# Patient Record
Sex: MALE | Race: BLACK OR AFRICAN AMERICAN | NOT HISPANIC OR LATINO | Employment: FULL TIME | URBAN - METROPOLITAN AREA
[De-identification: names, ages, dates, MRNs, and addresses within clinical notes are randomized per-mention and may not be internally consistent; named-entity substitution may affect disease eponyms.]

---

## 2017-01-03 ENCOUNTER — TELEPHONE (OUTPATIENT)
Dept: FAMILY MEDICINE | Facility: CLINIC | Age: 42
End: 2017-01-03

## 2017-01-03 DIAGNOSIS — E11.65 TYPE 2 DIABETES MELLITUS WITH HYPERGLYCEMIA, WITHOUT LONG-TERM CURRENT USE OF INSULIN (H): Primary | ICD-10-CM

## 2017-01-03 RX ORDER — PRAVASTATIN SODIUM 40 MG
40 TABLET ORAL DAILY
Qty: 90 TABLET | Refills: 3 | Status: SHIPPED | OUTPATIENT
Start: 2017-01-03 | End: 2018-01-17

## 2017-01-03 RX ORDER — GLIPIZIDE 5 MG/1
5 TABLET, FILM COATED, EXTENDED RELEASE ORAL DAILY
Qty: 90 TABLET | Refills: 0 | Status: SHIPPED | OUTPATIENT
Start: 2017-01-03 | End: 2017-01-04

## 2017-01-03 NOTE — TELEPHONE ENCOUNTER
[facility] phone call message- general phone call:    Reason for call: pt called stated that he normally goes to [facility] for his meds but they no longer excepting his ins he would like the meds to go to [facility] [address] He needs his Metformin, Pravacol, and Glipizide he normally gets 5 mg of this but pt wants it cut down to 2.5 mg because the 5mg is to heavy for the pt.  Please call pt back if you have any questions.   Return call needed: Yes    OK to leave a message on voice mail? Yes    Primary language: English      needed? No    Call taken on January 3, 2017 at 10:32 AM by Shnaia Gomez

## 2017-01-03 NOTE — TELEPHONE ENCOUNTER
Medications ordered and sent to [facility] on [address]. Would recommend continuing 5mg glipizide due to A1c>11 from 11/2016 visit. Patient should return for recheck of his diabetes as soon as he can.     Jaleel Ruelas MD; routed to RN

## 2017-01-04 ENCOUNTER — OFFICE VISIT (OUTPATIENT)
Dept: FAMILY MEDICINE | Facility: CLINIC | Age: 42
End: 2017-01-04

## 2017-01-04 VITALS
WEIGHT: 166 LBS | OXYGEN SATURATION: 97 % | BODY MASS INDEX: 25.16 KG/M2 | HEART RATE: 74 BPM | SYSTOLIC BLOOD PRESSURE: 130 MMHG | DIASTOLIC BLOOD PRESSURE: 79 MMHG | HEIGHT: 68 IN | TEMPERATURE: 97.8 F

## 2017-01-04 DIAGNOSIS — R73.9 HYPERGLYCEMIA: ICD-10-CM

## 2017-01-04 DIAGNOSIS — E11.9 TYPE 2 DIABETES MELLITUS WITHOUT COMPLICATION, WITHOUT LONG-TERM CURRENT USE OF INSULIN (H): Primary | ICD-10-CM

## 2017-01-04 DIAGNOSIS — E11.65 TYPE 2 DIABETES MELLITUS WITH HYPERGLYCEMIA, WITHOUT LONG-TERM CURRENT USE OF INSULIN (H): ICD-10-CM

## 2017-01-04 LAB — HBA1C MFR BLD: 8.6 % (ref 4.1–5.7)

## 2017-01-04 RX ORDER — GLIPIZIDE 2.5 MG/1
2.5 TABLET, EXTENDED RELEASE ORAL DAILY
Qty: 90 TABLET | Refills: 1 | Status: SHIPPED | OUTPATIENT
Start: 2017-01-04 | End: 2017-08-07

## 2017-01-04 NOTE — MR AVS SNAPSHOT
After Visit Summary   1/4/2017    Koko Milton    MRN: [id number]           Patient Information     Date Of Birth          1975        Visit Information        Provider Department      1/4/2017 3:10 PM Jaleel Ruelas MD [facility]        Today's Diagnoses     Diabetes mellitus, type 2 (H)    -  1     Type 2 diabetes mellitus with hyperglycemia, without long-term current use of insulin (H)         Hyperglycemia           Care Instructions    Koko was seen today for diabetes.    Diagnoses and all orders for this visit:    Diabetes mellitus, type 2 (H)  -     Hemoglobin A1c ([facility])    Type 2 diabetes mellitus with hyperglycemia, without long-term current use of insulin (H)  -     glipiZIDE (GLUCOTROL XL) 2.5 MG 24 hr tablet; Take 1 tablet (2.5 mg) by mouth daily    Hyperglycemia  -     GAD65 ([facility])  -     C-Peptide ([facility])  -     Misc. Wild Test ([facility])  -     Misc. Wild Test ([facility])      Please return to clinic in 2-3 months for recheck, sooner as needed.     Thank you for coming to [facility].  **If you had lab testing today and your results are reassuring or normal they will be be mailed to you within 7 days.   **If the lab tests need quick action we will call you with the results.  The phone number we will call with results is # [phone] (home) NONE (work). If this is not the best number please call our clinic and change the number.  If you need any refills please call your pharmacy and they will contact us.  If you have any concerns about today's visit or wish to schedule another appointment please call our office during normal business hours [phone] (8-5:00 M-F)  If you have urgent medical concerns please call [phone] at any time of the day.  If you a medical emergency please call [phone]  Again thank you for choosing [facility] and please let us know how we can best partner with you to improve you and your family's health.            Follow-ups  "after your visit        Future tests that were ordered for you today     Open Future Orders        Priority Expected Expires Ordered    Hemoglobin A1c ([facility]) Routine 1/4/2017 1/11/2017 1/4/2017            Who to contact     Please call your clinic at [phone] to:    Ask questions about your health    Make or cancel appointments    Discuss your medicines    Learn about your test results    Speak to your doctor   If you have compliments or concerns about an experience at your clinic, or if you wish to file a complaint, please contact [facility] Patient Relations at [phone] or email us at [email or web address]         Additional Information About Your Visit        [email or web address] Information     Finalta gives you secure access to your electronic health record. If you see a primary care provider, you can also send messages to your care team and make appointments. If you have questions, please call your primary care clinic.  If you do not have a primary care provider, please call [phone] and they will assist you.      Finalta is an electronic gateway that provides easy, online access to your medical records. With Finalta, you can request a clinic appointment, read your test results, renew a prescription or communicate with your care team.     To access your existing account, please contact your [facility] or call [phone] for assistance.        Care EveryWhere ID     This is your Care EveryWhere ID. This could be used by other organizations to access your [place] medical records  [id number]        Your Vitals Were     Pulse Temperature Height BMI (Body Mass Index) Pulse Oximetry       74 97.8  F (36.6  C) 5' 8\" (172.7 cm) 25.25 kg/m2 97%        Blood Pressure from Last 3 Encounters:   01/04/17 130/79   11/01/16 109/74   05/31/16 114/76    Weight from Last 3 Encounters:   01/04/17 166 lb (75.297 kg)   11/01/16 162 lb 6.4 oz (73.664 kg) "   05/31/16 168 lb (76.204 kg)              We Performed the Following     C-Peptide ([facility])     GAD65 ([facility])     Hemoglobin A1c ([facility])     Misc. Wild Test ([facility])     Misc. Wild Test ([facility])          Today's Medication Changes          These changes are accurate as of: 1/4/17  4:04 PM.  If you have any questions, ask your nurse or doctor.               These medicines have changed or have updated prescriptions.        Dose/Directions    glipiZIDE 2.5 MG 24 hr tablet   Commonly known as:  glipiZIDE XL   This may have changed:    - medication strength  - how much to take   Used for:  Type 2 diabetes mellitus with hyperglycemia, without long-term current use of insulin (H)   Changed by:  Jaleel Ruelas MD        Dose:  2.5 mg   Take 1 tablet (2.5 mg) by mouth daily   Quantity:  90 tablet   Refills:  1            Where to get your medicines      These medications were sent to [id number] - [place], [place] - [address], [place] [address]     Phone:  [phone]    - glipiZIDE 2.5 MG 24 hr tablet             Primary Care Provider Office Phone # Fax #    Jaleel Ruelas [phone]       [address] [address]        Thank you!     Thank you for choosing [facility]  for your care. Our goal is always to provide you with excellent care. Hearing back from our patients is one way we can continue to improve our services. Please take a few minutes to complete the written survey that you may receive in the mail after your visit with us. Thank you!             Your Updated Medication List - Protect others around you: Learn how to safely use, store and throw away your medicines at www.disposemymeds.org.          This list is accurate as of: 1/4/17  4:04 PM.  Always use your most recent med list.                   Brand Name Dispense Instructions for use    ACCU-CHEK COMPLETE Kit     1  Units    1 Units daily       BENEFIBER Tabs     120 tablet    Take 1 tablet by mouth every morning       blood glucose monitoring test strip    ONE TOUCH ULTRA    2 Box    Use to test blood sugars 4 times daily or as directed.       glipiZIDE 2.5 MG 24 hr tablet    glipiZIDE XL    90 tablet    Take 1 tablet (2.5 mg) by mouth daily       loratadine 10 MG tablet    CLARITIN    30 tablet    Take 1 tablet (10 mg) by mouth daily       metFORMIN 1000 MG tablet    GLUCOPHAGE    180 tablet    Take 1 tablet (1,000 mg) by mouth 2 times daily (with meals)       order for DME     1 each    Equipment being ordered: One Touch Ultra glucometer with lancets and test strips.       polyethylene glycol powder    MIRALAX    510 g    Take 17 g by mouth daily as needed for constipation       pravastatin 40 MG tablet    PRAVACHOL    90 tablet    Take 1 tablet (40 mg) by mouth daily       psyllium 63 % Powd    METAMUCIL SMOOTH TEXTURE    1 Bottle    Take 3 teaspoonful by mouth 3 times daily Mix in 8 ounces of water       tadalafil 5 MG tablet    CIALIS    30 tablet    Take 1 tablet (5 mg) by mouth daily Never use with nitroglycerin, terazosin or doxazosin.

## 2017-01-04 NOTE — PATIENT INSTRUCTIONS
Koko was seen today for diabetes.    Diagnoses and all orders for this visit:    Diabetes mellitus, type 2 (H)  -     Hemoglobin A1c ([facility])    Type 2 diabetes mellitus with hyperglycemia, without long-term current use of insulin (H)  -     glipiZIDE (GLUCOTROL XL) 2.5 MG 24 hr tablet; Take 1 tablet (2.5 mg) by mouth daily    Hyperglycemia  -     GAD65 ([facility])  -     C-Peptide ([facility])  -     Misc. Wild Test ([facility])  -     Misc. Wild Test ([facility])    Please return to clinic in 2-3 months for recheck, sooner as needed.     Thank you for coming to [facility].  **If you had lab testing today and your results are reassuring or normal they will be be mailed to you within 7 days.   **If the lab tests need quick action we will call you with the results.  The phone number we will call with results is # [phone] (home) NONE (work). If this is not the best number please call our clinic and change the number.  If you need any refills please call your pharmacy and they will contact us.  If you have any concerns about today's visit or wish to schedule another appointment please call our office during normal business hours [phone] (8-5:00 M-F)  If you have urgent medical concerns please call [phone] at any time of the day.  If you a medical emergency please call [phone]  Again thank you for choosing [facility] and please let us know how we can best partner with you to improve you and your family's health.

## 2017-01-04 NOTE — PROGRESS NOTES
OFFICE VISIT    ASSESSMENT/PLAN:   Koko was seen today for diabetes.  Diagnoses and all orders for this visit:    Type 2 diabetes mellitus with hyperglycemia, without long-term current use of insulin (H)  -     glipiZIDE (GLUCOTROL XL) 2.5 MG 24 hr tablet; Take 1 tablet (2.5 mg) by mouth daily        -     Hemoglobin A1c ([facility])    Hyperglycemia: will check for antibodies and c-peptide due to quick changes in A1c values to assess for DMT1.   -     GAD65 ([facility])  -     C-Peptide ([facility])  -     Misc. Wild Test ([facility])  -     Misc. Wild Test ([facility])    Please return to clinic in 2-3 months for recheck, sooner as needed.     SUBJECTIVE: 42 year old male with history of diabetes mellitus (likely type 2) and dyslipidemia presenting with recheck of diabetes. Between June and November, patient states that he has not been exercising as much. Eating seems to be unchanged overall, maybe a little more because of his schedule change in work.  He has not noticed any other changes in life style during this time. Since November, patient has been more active and careful with his eating as well. His father was diagnosed with diabetes in his 60s. Patient has no siblings or nephews/neices/children with diabetes.     A1c = 11.8 on 11/2016  Mediations: metformin and glipizide. Makes stools harder. He has had to take stool softeners for this.   Missed doses: couple of days due to refill lapse recently.   Sugars: does not check sugars at home. Focuses on food. The blood sugar results stress him out when he sees it. I can be 200-300s at times. Has not had symptoms of hypoglycemia, such as palpitations, diaphoresis, fainting, etc.   Blood pressures at home: SBP<120, DBP<80  Neuropathy/complications: none  Eye exam: 12/2016. Normal.   Foot exam: intermittently. No lesions are noted.     The 10 point ROS is negative except as stated in the HPI.    OBJECTIVE:   /79 mmHg  Pulse 74  Temp(Src) 97.8  F (36.6  C)  Ht  "5' 8\" (172.7 cm)  Wt 166 lb (75.297 kg)  BMI 25.25 kg/m2  SpO2 97%  GENERAL: No acute distress. Cooperative. Well-appearing.  HEENT: Normocephalic atraumatic. No conjunctival injection or scleral icterus. Nares patent without rhinorrhea. Oral mucosa is moist  CARDIO: Regular rate and rhythm. S1 S2 present. No murmurs, gallops, or rubs.  RESP: Clear to auscultation bilaterally. No wheezing, rales or rhonchi. Good breath sounds throughout. No use of intercostal or other accessory muscles to breathe.  ABDO: Positive bowel sounds. Non-distended. Non-tender. No guarding. No rebound tenderness.  INTEGUMENTARY: Warm, dry.    Discussed with Dr. Murphy who agrees with the above assessment and plan.    Jaleel Ruelas MD      "

## 2017-01-04 NOTE — PROGRESS NOTES
"Preceptor attestation:  Vital signs reviewed: /79 mmHg  Pulse 74  Temp(Src) 97.8  F (36.6  C)  Ht 5' 8\" (172.7 cm)  Wt 166 lb (75.297 kg)  BMI 25.25 kg/m2  SpO2 97%    Patient seen and discussed with the resident.  Assessment and plan reviewed with resident and agreed upon.    Supervising physician: Valeria Murphy  [facility]  "

## 2017-01-05 DIAGNOSIS — R73.9 HYPERGLYCEMIA: ICD-10-CM

## 2017-01-06 LAB — GAD65 ABY ASSAY SER TEST: 0.02 NMOL/L

## 2017-01-10 ENCOUNTER — RECORDS - HEALTHEAST (OUTPATIENT)
Dept: ADMINISTRATIVE | Facility: OTHER | Age: 42
End: 2017-01-10

## 2017-01-10 LAB
DEPARTMENT: NORMAL
DEPARTMENT: NORMAL
PERFORMING LAB: NORMAL
PERFORMING LAB: NORMAL
SCAN RESULT: NORMAL
SCAN RESULT: NORMAL
SEE NOTE: NORMAL
SEE NOTE: NORMAL

## 2017-03-29 ENCOUNTER — TELEPHONE (OUTPATIENT)
Dept: FAMILY MEDICINE | Facility: CLINIC | Age: 42
End: 2017-03-29

## 2017-03-31 DIAGNOSIS — E11.8 TYPE 2 DIABETES MELLITUS WITH COMPLICATION, WITHOUT LONG-TERM CURRENT USE OF INSULIN (H): Primary | ICD-10-CM

## 2017-04-11 ENCOUNTER — OFFICE VISIT (OUTPATIENT)
Dept: FAMILY MEDICINE | Facility: CLINIC | Age: 42
End: 2017-04-11

## 2017-04-11 VITALS
TEMPERATURE: 97.6 F | WEIGHT: 177.6 LBS | BODY MASS INDEX: 27 KG/M2 | DIASTOLIC BLOOD PRESSURE: 78 MMHG | HEART RATE: 63 BPM | SYSTOLIC BLOOD PRESSURE: 118 MMHG

## 2017-04-11 DIAGNOSIS — E11.8 TYPE 2 DIABETES MELLITUS WITH COMPLICATION, WITHOUT LONG-TERM CURRENT USE OF INSULIN (H): Primary | ICD-10-CM

## 2017-04-11 DIAGNOSIS — R06.7 SNEEZING WITH WATERY EYES: ICD-10-CM

## 2017-04-11 DIAGNOSIS — R07.9 EXERTIONAL CHEST PAIN: ICD-10-CM

## 2017-04-11 DIAGNOSIS — G44.209 TENSION HEADACHE: ICD-10-CM

## 2017-04-11 DIAGNOSIS — H04.209 SNEEZING WITH WATERY EYES: ICD-10-CM

## 2017-04-11 LAB
CHOLEST SERPL-MCNC: 179.2 MG/DL (ref 0–200)
CHOLEST/HDLC SERPL: 5.1 {RATIO} (ref 0–5)
HBA1C MFR BLD: 9.1 % (ref 4.1–5.7)
HDLC SERPL-MCNC: 34.9 MG/DL
LDLC SERPL DIRECT ASSAY-MCNC: 76 MG/DL
TRIGL SERPL-MCNC: 474.8 MG/DL (ref 0–150)
VLDL CHOLESTEROL: 95 MG/DL (ref 7–32)

## 2017-04-11 RX ORDER — LORATADINE 10 MG/1
10 TABLET ORAL DAILY
Qty: 30 TABLET | Refills: 1 | Status: SHIPPED | OUTPATIENT
Start: 2017-04-11 | End: 2018-02-21

## 2017-04-11 NOTE — MR AVS SNAPSHOT
After Visit Summary   4/11/2017    Koko Milton    MRN: [id number]           Patient Information     Date Of Birth          1975        Visit Information        Provider Department      4/11/2017 8:00 AM Rob Padilla MD [facility]        Today's Diagnoses     Type 2 diabetes mellitus with complication, without long-term current use of insulin (H)    -  1    Sneezing with watery eyes        Exertional chest pain        Tension headache           Follow-ups after your visit        Follow-up notes from your care team     Return in about 3 months (around 7/11/2017) for Routine Visit.      Future tests that were ordered for you today     Open Future Orders        Priority Expected Expires Ordered    EXERCISE STRESS TEST NL Routine  7/10/2017 4/11/2017            Who to contact     Please call your clinic at [phone] to:    Ask questions about your health    Make or cancel appointments    Discuss your medicines    Learn about your test results    Speak to your doctor   If you have compliments or concerns about an experience at your clinic, or if you wish to file a complaint, please contact [facility] Patient Relations at [phone] or email us at [email or web address]         Additional Information About Your Visit        MyChart Information     Cinemagramt gives you secure access to your electronic health record. If you see a primary care provider, you can also send messages to your care team and make appointments. If you have questions, please call your primary care clinic.  If you do not have a primary care provider, please call [phone] and they will assist you.      Cinemagramt is an electronic gateway that provides easy, online access to your medical records. With QingKe, you can request a clinic appointment, read your test results, renew a prescription or communicate with your care team.     To access your existing account, please contact your  [facility] or call [phone] for assistance.        Care EveryWhere ID     This is your Care EveryWhere ID. This could be used by other organizations to access your [place] medical records  [id number]        Your Vitals Were     Pulse Temperature BMI (Body Mass Index)             63 97.6  F (36.4  C) (Oral) 27 kg/m2          Blood Pressure from Last 3 Encounters:   04/11/17 118/78   01/04/17 130/79   11/01/16 109/74    Weight from Last 3 Encounters:   04/11/17 177 lb 9.6 oz (80.6 kg)   01/04/17 166 lb (75.3 kg)   11/01/16 162 lb 6.4 oz (73.7 kg)              We Performed the Following     Hemoglobin A1c ([facility])     LDL Cholesterol  Direct ([facility])     Lipid Panel ([place])          Where to get your medicines      These medications were sent to [id number] - [place], [place] - [address], [place] [address]     Phone:  [phone]     loratadine 10 MG tablet          Primary Care Provider Office Phone # Fax #    Jaleel Ruelas [phone]       [facility] [address] [facility] [address]        Thank you!     Thank you for choosing [facility]  for your care. Our goal is always to provide you with excellent care. Hearing back from our patients is one way we can continue to improve our services. Please take a few minutes to complete the written survey that you may receive in the mail after your visit with us. Thank you!             Your Updated Medication List - Protect others around you: Learn how to safely use, store and throw away your medicines at www.disposemymeds.org.          This list is accurate as of: 4/11/17  9:49 AM.  Always use your most recent med list.                   Brand Name Dispense Instructions for use    ACCU-CHEK COMPLETE Kit     1 Units    1 Units daily       BENEFIBER Tabs     120 tablet    Take 1 tablet by mouth every morning       blood glucose  monitoring test strip    ONE TOUCH ULTRA    2 Box    Use to test blood sugars 4 times daily or as directed.       glipiZIDE 2.5 MG 24 hr tablet    glipiZIDE XL    90 tablet    Take 1 tablet (2.5 mg) by mouth daily       loratadine 10 MG tablet    CLARITIN    30 tablet    Take 1 tablet (10 mg) by mouth daily       metFORMIN 1000 MG tablet    GLUCOPHAGE    180 tablet    Take 1 tablet (1,000 mg) by mouth 2 times daily (with meals)       order for DME     1 each    Equipment being ordered: One Touch Ultra glucometer with lancets and test strips.       polyethylene glycol powder    MIRALAX    510 g    Take 17 g by mouth daily as needed for constipation       pravastatin 40 MG tablet    PRAVACHOL    90 tablet    Take 1 tablet (40 mg) by mouth daily       psyllium 63 % Powd    METAMUCIL SMOOTH TEXTURE    1 Bottle    Take 3 teaspoonful by mouth 3 times daily Mix in 8 ounces of water       tadalafil 5 MG tablet    CIALIS    30 tablet    Take 1 tablet (5 mg) by mouth daily Never use with nitroglycerin, terazosin or doxazosin.

## 2017-04-11 NOTE — PROGRESS NOTES
SUBJECTIVE:  Koko Milton is a 42 year old male with a PMH of    Patient Active Problem List   Diagnosis     Diabetes mellitus, type 2 (H)     Dyslipidemia     ED (erectile dysfunction)     Tendinopathy of right rotator cuff     Sneezing with watery eyes     Who presents for evaluation of   Chief Complaint   Patient presents with     Diabetes     DM Check up      Headache     Constant headache for 1 week, states its a mild headache more at night time and in the morning      Eye Problem     Itchy eye x3 months        Currently taking 2.5 of glipizide and 2000 of Metformin.  Has not been testing.  He is trying to control his diet.  Some changes in vision in the AM, no polydipsia or polyuria.    Headache is non specific and occiput, mild achy, Tylenol 500 mg.     Itchy eyes for the last 3 months, itchy eyes and throat.    Endorses tachycardia/palpitations, chest pain is localized, without radiations.     Denies N/V, changes in bowel or bladder    OBJECTIVE:  /78  Pulse 63  Temp 97.6  F (36.4  C) (Oral)  Wt 177 lb 9.6 oz (80.6 kg)  BMI 27 kg/m2  EXAM:  Constitutional: healthy, alert and no distress   Cardiovascular: RRR. No murmurs, clicks gallops or rub  Respiratory: CTAB, no wheezes  NEURO: Gait normal. Reflexes normal and symmetric. Sensation grossly WNL.      ASSESSMENT:  Koko was seen today for diabetes, headache and eye problem.    Diagnoses and all orders for this visit:    Type 2 diabetes mellitus with complication, without long-term current use of insulin (H)  -     Hemoglobin A1c ([facility])  -     Lipid Panel ([facility])    Sneezing with watery eyes  -     loratadine (CLARITIN) 10 MG tablet; Take 1 tablet (10 mg) by mouth daily    Exertional chest pain  -     EXERCISE STRESS TEST NL; Future    Tension headache Discussed with patient increasing his usage of APAP, PRN for his headache, 1000 mg TID.      Total of 20 minutes was spent in face to face contact with patient with > 50% in counseling and  coordination of care.  Options for treatment and/or follow-up care were reviewed with the patient. Koko Milton was engaged and actively involved in the decision making process. He verbalized understanding of the options discussed and was satisfied with the final plan.  RTC in 3 months for follow up of Diabetes or sooner if develops new or worsening symptoms.    Discussed with Dr. Ralph Thornton.     Rob Padilla MD

## 2017-04-11 NOTE — PROGRESS NOTES
Preceptor attestation:  Patient seen and discussed with the resident. Assessment and plan reviewed with resident and agreed upon.  Supervising physician: Ralph Thornton  [facility]

## 2017-04-13 NOTE — PATIENT INSTRUCTIONS
[facility]  Stress ECHO  [phone]  Referral has been faxed they will contact pt to schedule  Michelle Mukherjee 2:07 PM 4/13/2017

## 2017-04-19 ENCOUNTER — TELEPHONE (OUTPATIENT)
Dept: FAMILY MEDICINE | Facility: CLINIC | Age: 42
End: 2017-04-19

## 2017-04-19 ENCOUNTER — RECORDS - HEALTHEAST (OUTPATIENT)
Dept: ADMINISTRATIVE | Facility: OTHER | Age: 42
End: 2017-04-19

## 2017-04-19 NOTE — TELEPHONE ENCOUNTER
Call from [facility] to clarify orders, if test needed is a nuclear med scan (AGNIESZKA SCAN or excersise stress ECHO( treadmill). Please advise and place correct orders.    Routed to Dr Nixon

## 2017-05-11 ENCOUNTER — OFFICE VISIT (OUTPATIENT)
Dept: FAMILY MEDICINE | Facility: CLINIC | Age: 42
End: 2017-05-11

## 2017-05-11 ENCOUNTER — DOCUMENTATION ONLY (OUTPATIENT)
Dept: FAMILY MEDICINE | Facility: CLINIC | Age: 42
End: 2017-05-11

## 2017-05-11 VITALS
HEIGHT: 69 IN | OXYGEN SATURATION: 96 % | BODY MASS INDEX: 25.89 KG/M2 | WEIGHT: 174.8 LBS | HEART RATE: 90 BPM | DIASTOLIC BLOOD PRESSURE: 75 MMHG | TEMPERATURE: 97.8 F | SYSTOLIC BLOOD PRESSURE: 110 MMHG

## 2017-05-11 DIAGNOSIS — F41.9 ANXIETY: Primary | ICD-10-CM

## 2017-05-11 NOTE — PATIENT INSTRUCTIONS
Start sertraline 50 mg daily   Follow-up in clinic in 4 weeks or sooner if needed     Thank you for coming to [facility].  **If you had lab testing today and your results are reassuring or normal they will be be mailed to you within 7 days.   **If the lab tests need quick action we will call you with the results.  The phone number we will call with results is # [phone] (home) NONE (work). If this is not the best number please call our clinic and change the number.  If you need any refills please call your pharmacy and they will contact us.  If you have any concerns about today's visit or wish to schedule another appointment please call our office during normal business hours [phone] (8-5:00 M-F)  If you have urgent medical concerns please call [phone] at any time of the day.  If you a medical emergency please call [phone]  Again thank you for choosing [facility] and please let us know how we can best partner with you to improve you and your family's health.    MENTAL HEALTH REFERRAL:  See 5/11/17 Documentation Only encounter for referral information.  Sayda Pack  5/11/17  Mailed letter to patient with a list of Psychology clinics recommended by   Dr. Infante.  Sayda Pack  5/18/17

## 2017-05-11 NOTE — PROGRESS NOTES
MENTAL HEALTH REFERRAL  Message routed to Team [place] to evaluate for in house therapy.  Sayda Pack  5/11/17

## 2017-05-11 NOTE — PROGRESS NOTES
Hello Team [place]  Please review chart notes to evaluate for in house therapy.  Thank you.  Sayda Salas

## 2017-05-11 NOTE — MR AVS SNAPSHOT
After Visit Summary   5/11/2017    Koko Milton    MRN: [id number]           Patient Information     Date Of Birth          1975        Visit Information        Provider Department      5/11/2017 1:50 PM Darren Wang DO [facility]        Today's Diagnoses     Anxiety    -  1      Care Instructions    Start sertraline 50 mg daily   Follow-up in clinic in 4 weeks or sooner if needed     Thank you for coming to [facility].  **If you had lab testing today and your results are reassuring or normal they will be be mailed to you within 7 days.   **If the lab tests need quick action we will call you with the results.  The phone number we will call with results is # [phone] (home) [phone] (work). If this is not the best number please call our clinic and change the number.  If you need any refills please call your pharmacy and they will contact us.  If you have any concerns about today's visit or wish to schedule another appointment please call our office during normal business hours [phone] (8-5:00 M-F)  If you have urgent medical concerns please call [phone] at any time of the day.  If you a medical emergency please call [phone]  Again thank you for choosing [facility] and please let us know how we can best partner with you to improve you and your family's health.            Follow-ups after your visit        Who to contact     Please call your clinic at [phone] to:    Ask questions about your health    Make or cancel appointments    Discuss your medicines    Learn about your test results    Speak to your doctor   If you have compliments or concerns about an experience at your clinic, or if you wish to file a complaint, please contact [facility] Patient Relations at [phone] or email us at [email or web address]         Additional Information About Your Visit        Internet [employer] - Golden Lines[employer] Information     Festicket gives you secure access to your  "electronic health record. If you see a primary care provider, you can also send messages to your care team and make appointments. If you have questions, please call your primary care clinic.  If you do not have a primary care provider, please call [phone] and they will assist you.      [employer] is an electronic gateway that provides easy, online access to your medical records. With [employer], you can request a clinic appointment, read your test results, renew a prescription or communicate with your care team.     To access your existing account, please contact your [facility] or call [phone] for assistance.        Care EveryWhere ID     This is your Care EveryWhere ID. This could be used by other organizations to access your [place] medical records  [id number]        Your Vitals Were     Pulse Temperature Height Pulse Oximetry BMI (Body Mass Index)       90 97.8  F (36.6  C) (Oral) 5' 9.29\" (176 cm) 92% 25.6 kg/m2        Blood Pressure from Last 3 Encounters:   05/11/17 110/75   04/11/17 118/78   01/04/17 130/79    Weight from Last 3 Encounters:   05/11/17 174 lb 12.8 oz (79.3 kg)   04/11/17 177 lb 9.6 oz (80.6 kg)   01/04/17 166 lb (75.3 kg)              Today, you had the following     No orders found for display         Today's Medication Changes          These changes are accurate as of: 5/11/17  2:35 PM.  If you have any questions, ask your nurse or doctor.               Start taking these medicines.        Dose/Directions    sertraline 50 MG tablet   Commonly known as:  ZOLOFT   Used for:  Anxiety   Started by:  Darren Wang DO        Dose:  50 mg   Take 1 tablet (50 mg) by mouth daily   Quantity:  30 tablet   Refills:  1            Where to get your medicines      These medications were sent to [id number] - [place], [place] - [address], [place] [address]     Phone:  [phone]     sertraline " 50 MG tablet                Primary Care Provider Office Phone # Fax #    Jaleel Ruelas [phone]       [address] [address]        Thank you!     Thank you for choosing [facility]  for your care. Our goal is always to provide you with excellent care. Hearing back from our patients is one way we can continue to improve our services. Please take a few minutes to complete the written survey that you may receive in the mail after your visit with us. Thank you!             Your Updated Medication List - Protect others around you: Learn how to safely use, store and throw away your medicines at www.disposemymeds.org.          This list is accurate as of: 5/11/17  2:35 PM.  Always use your most recent med list.                   Brand Name Dispense Instructions for use    ACCU-CHEK COMPLETE Kit     1 Units    1 Units daily       BENEFIBER Tabs     120 tablet    Take 1 tablet by mouth every morning       blood glucose monitoring test strip    ONE TOUCH ULTRA    2 Box    Use to test blood sugars 4 times daily or as directed.       glipiZIDE 2.5 MG 24 hr tablet    glipiZIDE XL    90 tablet    Take 1 tablet (2.5 mg) by mouth daily       loratadine 10 MG tablet    CLARITIN    30 tablet    Take 1 tablet (10 mg) by mouth daily       metFORMIN 1000 MG tablet    GLUCOPHAGE    180 tablet    Take 1 tablet (1,000 mg) by mouth 2 times daily (with meals)       order for DME     1 each    Equipment being ordered: One Touch Ultra glucometer with lancets and test strips.       polyethylene glycol powder    MIRALAX    510 g    Take 17 g by mouth daily as needed for constipation       pravastatin 40 MG tablet    PRAVACHOL    90 tablet    Take 1 tablet (40 mg) by mouth daily       psyllium 63 % Powd    METAMUCIL SMOOTH TEXTURE    1 Bottle    Take 3 teaspoonful by mouth 3 times daily Mix in 8 ounces of water       sertraline 50 MG tablet    ZOLOFT    30 tablet    Take 1 tablet (50 mg) by  mouth daily       tadalafil 5 MG tablet    CIALIS    30 tablet    Take 1 tablet (5 mg) by mouth daily Never use with nitroglycerin, terazosin or doxazosin.

## 2017-05-11 NOTE — PROGRESS NOTES
"Subjective:  Koko Milton is a 42 year old male with a history of     Patient Active Problem List    Diagnosis Date Noted     Tendinopathy of right rotator cuff 05/31/2016     Priority: Medium     PT ordered 5/31/2016       Sneezing with watery eyes 05/31/2016     Priority: Medium     Loratadine ordered 5/31/16.       Dyslipidemia 04/22/2015     Priority: Medium     ED (erectile dysfunction) 04/22/2015     Priority: Medium     Diabetes mellitus, type 2 (H) 01/06/2015     Priority: Medium     Who presents today with medication concerns.     States that he has had issues with reading for a long period of time and will think about other things while he reads. States that he has 5 children who were diagnosed with ADHD and thinks that he may have issues with ADHD. Also has issues with remembering where he places objects. Has never mentioned his memory issues to a provider in the past.     Unsure if he has family members with memory issues but some of them had mentioned it to him in the past. Usually the concerns seem to revolve around focus or concentration.     He has attended [employer] for the past 4 years.      States that he has stress with his immigration status but has spoken to  in the past. Was living in [place] but moved to [place] due to his citizenship not being accepted. This has been a major focus for him recently and states that he thinks about this frequently.     ROS   General; No fever or chills   HEENT; No sore throat   Heart; No chest pain or chest pressure.   Lungs: Occasional shortness of breath but not currently.     Objective:  Vitals: /75 (BP Location: Left arm, Patient Position: Chair)  Pulse 90  Temp 97.8  F (36.6  C) (Oral)  Ht 5' 9.29\" (176 cm)  Wt 174 lb 12.8 oz (79.3 kg)  SpO2 96%  BMI 25.6 kg/m2  General: Well-nourished. Alert and cooperative. No apparent distress.  HEENT: Normocephalic and atraumatic head.  Extraocular movements intact  Pupils equal, round, and " reactive. Tympanic membranes clear.   Cardiovascular: Regular rate and rhythm. Normal S1 and S2.   Musculoskeletal: No gross deformities. Normal ROM. Gait normal. Normal muscle tone. Strength 5/5 in all 4 extremities.  Skin: No suspicious lesions or rashes.  Neurologic: CN II-XII intact. Sensation symmetric throughout. Patellar reflexes 2+ and symmetric. Cerebellar testing normal.  Psychiatric: Oriented to person, place, and time. Appropriate mood and affect.  Mentation appears normal.    PHQ9: 17    Assessment:  Koko Milton is a 42 year old male with medication concerns.     Plan:    Anxiety/Concerntration Concerns: Uncertain of the cause but his PHQ9 was quite elevated today. 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 Will start him on sertraline and have him follow-up in a few weeks. Will also placed a referral for mental health to evaluate for possible ADHD. Did not have time today but at next visit could put him in contact with [facility].   -     sertraline (ZOLOFT) 50 MG tablet; Take 1 tablet (50 mg) by mouth daily  -     MENTAL HEALTH REFERRAL    Patient was discussed with and seen by Dr. Quinn.    Darren Wang PGY3

## 2017-05-11 NOTE — PROGRESS NOTES
Preceptor attestation:  Patient seen and discussed with the resident. Assessment and plan reviewed with resident and agreed upon.  Supervising physician: Dangelo Quinn  [facility]

## 2017-05-12 ASSESSMENT — PATIENT HEALTH QUESTIONNAIRE - PHQ9: SUM OF ALL RESPONSES TO PHQ QUESTIONS 1-9: 17

## 2017-05-12 NOTE — PROGRESS NOTES
This is a referral for adult ADHD evaluation.  Please provide patient with the following information:    [facility]  [address]  [place]  [address]  [phone]    [facility]  [address]  [place]  [address]  [phone]    [facility]  [address]  [place]  [address]  [phone]

## 2017-06-19 NOTE — TELEPHONE ENCOUNTER
Order was not completed. A new order was not sent.  Message sent to Dr. Padilla and Alessandra Cr

## 2017-06-28 ENCOUNTER — MYC REFILL (OUTPATIENT)
Dept: FAMILY MEDICINE | Facility: CLINIC | Age: 42
End: 2017-06-28

## 2017-06-28 ENCOUNTER — MYC MEDICAL ADVICE (OUTPATIENT)
Dept: FAMILY MEDICINE | Facility: CLINIC | Age: 42
End: 2017-06-28

## 2017-06-28 DIAGNOSIS — E11.9 TYPE 2 DIABETES MELLITUS (H): ICD-10-CM

## 2017-06-28 DIAGNOSIS — K59.00 CONSTIPATION, UNSPECIFIED CONSTIPATION TYPE: ICD-10-CM

## 2017-06-28 DIAGNOSIS — E11.9 TYPE 2 DIABETES MELLITUS WITHOUT COMPLICATION, UNSPECIFIED LONG TERM INSULIN USE STATUS: ICD-10-CM

## 2017-06-28 RX ORDER — BLOOD-GLUCOSE METER
EACH MISCELLANEOUS
Qty: 1 KIT | Refills: 0 | Status: SHIPPED | OUTPATIENT
Start: 2017-06-28 | End: 2018-03-23

## 2017-06-28 NOTE — TELEPHONE ENCOUNTER
Message from [place]:  Linda Jean RN Wed Jun 28, 2017 9:52 AM        ----- Message -----   From: Koko Milton   Sent: 6/28/2017 12:14 AM   To: Miguel eJong Rn  Subject: Medication Renewal Request     Original authorizing provider: MD Koko Arroyo would like a refill of the following medications:  Wheat Dextrin (BENEFIBER) TABS [Giorgi Hand MD]    Preferred pharmacy: [id number] - [place], [place] - [facility] [address] [facility]    Comment:  I need vitamin B12 since metformin decreases vitamin B12

## 2017-06-29 NOTE — TELEPHONE ENCOUNTER
Message from [place]:  Haylie Ordoñez RN Wed Jun 28, 2017 7:36 AM        ----- Message -----   From: Koko Milton   Sent: 6/28/2017 12:11 AM   To: [facility] Staff-  Subject: Medication Renewal Request     Original authorizing provider: MD Koko Quispe would like a refill of the following medications:  order for DME [Conrado Stout MD]    Preferred pharmacy: [facility] #[id number] - [place], [place] - [facility] [address] AT [facility]    Comment:  I need complete kit    Medication renewals requested in this message routed to other providers:  Blood Glucose Monitoring Suppl (ACCU-CHEK COMPLETE) KIT [Cristo Jack MD]

## 2017-08-07 DIAGNOSIS — E11.65 TYPE 2 DIABETES MELLITUS WITH HYPERGLYCEMIA, WITHOUT LONG-TERM CURRENT USE OF INSULIN (H): ICD-10-CM

## 2017-08-07 RX ORDER — GLIPIZIDE 2.5 MG/1
2.5 TABLET, EXTENDED RELEASE ORAL DAILY
Qty: 90 TABLET | Refills: 1 | Status: SHIPPED | OUTPATIENT
Start: 2017-08-07 | End: 2017-09-08

## 2017-09-08 DIAGNOSIS — E11.65 TYPE 2 DIABETES MELLITUS WITH HYPERGLYCEMIA, WITHOUT LONG-TERM CURRENT USE OF INSULIN (H): ICD-10-CM

## 2017-09-09 RX ORDER — GLIPIZIDE 2.5 MG/1
2.5 TABLET, EXTENDED RELEASE ORAL DAILY
Qty: 90 TABLET | Refills: 1 | Status: SHIPPED | OUTPATIENT
Start: 2017-09-09 | End: 2018-02-21

## 2017-09-11 NOTE — TELEPHONE ENCOUNTER
Called patient and informed him to make an appointment to follow up on his DM, he said that he needed to change the pharmacy to [facility]. I called that Rx in to the pharmacy. Transferred him downstairs to make the appointment.

## 2017-09-12 DIAGNOSIS — E11.8 TYPE 2 DIABETES MELLITUS WITH COMPLICATION, WITHOUT LONG-TERM CURRENT USE OF INSULIN (H): Primary | ICD-10-CM

## 2018-01-17 DIAGNOSIS — E11.65 TYPE 2 DIABETES MELLITUS WITH HYPERGLYCEMIA, WITHOUT LONG-TERM CURRENT USE OF INSULIN (H): ICD-10-CM

## 2018-01-17 RX ORDER — PRAVASTATIN SODIUM 40 MG
40 TABLET ORAL DAILY
Qty: 30 TABLET | Refills: 0 | Status: SHIPPED | OUTPATIENT
Start: 2018-01-17 | End: 2018-02-21

## 2018-02-21 ENCOUNTER — OFFICE VISIT (OUTPATIENT)
Dept: FAMILY MEDICINE | Facility: CLINIC | Age: 43
End: 2018-02-21
Payer: COMMERCIAL

## 2018-02-21 VITALS
WEIGHT: 168.4 LBS | BODY MASS INDEX: 24.66 KG/M2 | DIASTOLIC BLOOD PRESSURE: 72 MMHG | HEART RATE: 86 BPM | TEMPERATURE: 98.5 F | SYSTOLIC BLOOD PRESSURE: 112 MMHG | OXYGEN SATURATION: 96 %

## 2018-02-21 DIAGNOSIS — K59.00 CONSTIPATION, UNSPECIFIED CONSTIPATION TYPE: ICD-10-CM

## 2018-02-21 DIAGNOSIS — E11.9 TYPE 2 DIABETES MELLITUS WITHOUT COMPLICATION, WITHOUT LONG-TERM CURRENT USE OF INSULIN (H): ICD-10-CM

## 2018-02-21 DIAGNOSIS — E11.65 TYPE 2 DIABETES MELLITUS WITH HYPERGLYCEMIA, WITHOUT LONG-TERM CURRENT USE OF INSULIN (H): ICD-10-CM

## 2018-02-21 DIAGNOSIS — F41.9 ANXIETY: ICD-10-CM

## 2018-02-21 RX ORDER — POLYETHYLENE GLYCOL 3350 17 G/17G
1 POWDER, FOR SOLUTION ORAL DAILY PRN
Qty: 510 G | Refills: 1 | Status: SHIPPED | OUTPATIENT
Start: 2018-02-21 | End: 2019-06-18

## 2018-02-21 RX ORDER — BLOOD-GLUCOSE METER
EACH MISCELLANEOUS
Qty: 1 KIT | Refills: 0 | Status: CANCELLED | OUTPATIENT
Start: 2018-02-21

## 2018-02-21 RX ORDER — GLIPIZIDE 2.5 MG/1
2.5 TABLET, EXTENDED RELEASE ORAL DAILY
Qty: 90 TABLET | Refills: 1 | Status: SHIPPED | OUTPATIENT
Start: 2018-02-21 | End: 2018-03-23

## 2018-02-21 RX ORDER — PRAVASTATIN SODIUM 40 MG
40 TABLET ORAL DAILY
Qty: 30 TABLET | Refills: 0 | Status: SHIPPED | OUTPATIENT
Start: 2018-02-21 | End: 2018-03-23

## 2018-02-21 NOTE — MR AVS SNAPSHOT
After Visit Summary   2018    Koko Milton    MRN: [id number]           Patient Information     Date Of Birth          1975        Visit Information        Provider Department      2018 3:50 PM Nydia Addison MD [facility]        Today's Diagnoses     Constipation, unspecified constipation type        Anxiety        Type 2 diabetes mellitus with hyperglycemia, without long-term current use of insulin (H)        Type 2 diabetes mellitus without complication, without long-term current use of insulin (H)           Follow-ups after your visit        Follow-up notes from your care team     Return in about 3 months (around 2018).      Future tests that were ordered for you today     Open Future Orders        Priority Expected Expires Ordered    Hemoglobin A1c ([facility]) Routine  3/21/2018 2018    Basic Metabolic Panel ([facility]) Routine  3/21/2018 2018    Lipid Panel ([facility]) Routine  3/21/2018 2018            Who to contact     Please call your clinic at [phone] to:    Ask questions about your health    Make or cancel appointments    Discuss your medicines    Learn about your test results    Speak to your doctor            Additional Information About Your Visit        MyChart Information     [employer] is an electronic gateway that provides easy, online access to your medical records. With [employer], you can request a clinic appointment, read your test results, renew a prescription or communicate with your care team.     To sign up for [employer] visit the website at [email or web address]   You will be asked to enter the access code listed below, as well as some personal information. Please follow the directions to create your username and password.     Your access code is: [email or web address]  Expires: 2018  4:59 PM     Your access code will  in 90 days. If you need help or a new code, please contact your [facility] or  call [phone] for assistance.        Care EveryWhere ID     This is your Care EveryWhere ID. This could be used by other organizations to access your [place] medical records  [id number]        Your Vitals Were     Pulse Temperature Pulse Oximetry BMI (Body Mass Index)          86 98.5  F (36.9  C) (Oral) 96% 24.66 kg/m2         Blood Pressure from Last 3 Encounters:   02/21/18 112/72   05/11/17 110/75   04/11/17 118/78    Weight from Last 3 Encounters:   02/21/18 168 lb 6.4 oz (76.4 kg)   05/11/17 174 lb 12.8 oz (79.3 kg)   04/11/17 177 lb 9.6 oz (80.6 kg)                 Where to get your medicines      These medications were sent to [id number] - [place], [place] - [address] AT [facility]  [address]  [facility] [address], [place] [address]    Hours:  24-hours Phone:  [phone]     glipiZIDE 2.5 MG 24 hr tablet    metFORMIN 1000 MG tablet    polyethylene glycol powder    pravastatin 40 MG tablet    sertraline 50 MG tablet         Some of these will need a paper prescription and others can be bought over the counter.  Ask your nurse if you have questions.     Bring a paper prescription for each of these medications     order for DME          Primary Care Provider Office Phone # Fax #    Vijaya Mayo [phone]       [facility] [address] [place] [address]        Equal Access to Services     ARACELI BERG AH: Hadii sarai ku hadasho Soomaali, waaxda luqadaha, qaybta kaalmada adeegyada, jensen rodríguez. So [facility] [phone].    ATENCIÓN: Si habla español, tiene a bentley disposición servicios gratuitos de asistencia lingüística. Llame al [phone].    We comply with applicable federal civil rights laws and [place] laws. We do not discriminate on the basis of race, color, national origin, age, disability, sex, sexual orientation, or gender identity.            Thank you!     Thank you for choosing [facility]   for your care. Our goal is always to provide you with excellent care. Hearing back from our patients is one way we can continue to improve our services. Please take a few minutes to complete the written survey that you may receive in the mail after your visit with us. Thank you!             Your Updated Medication List - Protect others around you: Learn how to safely use, store and throw away your medicines at www.disposemymeds.org.          This list is accurate as of 2/21/18  4:59 PM.  Always use your most recent med list.                   Brand Name Dispense Instructions for use Diagnosis    * ACCU-CHEK COMPLETE Kit     1 Units    1 Units daily    Type 2 diabetes mellitus (H)       * blood glucose monitoring meter device kit     1 kit    Use to test blood sugars 4 times daily or as directed.    Type 2 diabetes mellitus (H)       blood glucose monitoring test strip    ONETOUCH ULTRA    2 Box    Use to test blood sugars 4 times daily or as directed.    Diabetes mellitus, type 2 (H)       glipiZIDE 2.5 MG 24 hr tablet    GLUCOTROL XL    90 tablet    Take 1 tablet (2.5 mg) by mouth daily    Type 2 diabetes mellitus with hyperglycemia, without long-term current use of insulin (H)       metFORMIN 1000 MG tablet    GLUCOPHAGE    60 tablet    Take 1 tablet (1,000 mg) by mouth 2 times daily (with meals) Needs appointment for further refills.    Type 2 diabetes mellitus with hyperglycemia, without long-term current use of insulin (H)       order for DME     1 each    Equipment being ordered: One Touch Ultra glucometer with lancets and test strips.    Type 2 diabetes mellitus without complication, without long-term current use of insulin (H)       polyethylene glycol powder    MIRALAX    510 g    Take 17 g (1 capful) by mouth daily as needed for constipation    Constipation, unspecified constipation type       pravastatin 40 MG tablet    PRAVACHOL    30 tablet    Take 1 tablet (40 mg) by mouth daily Needs appointment for  further refills.    Type 2 diabetes mellitus with hyperglycemia, without long-term current use of insulin (H)       sertraline 50 MG tablet    ZOLOFT    30 tablet    Take 1 tablet (50 mg) by mouth daily    Anxiety       * Notice:  This list has 2 medication(s) that are the same as other medications prescribed for you. Read the directions carefully, and ask your doctor or other care provider to review them with you.

## 2018-02-21 NOTE — PROGRESS NOTES
SUBJECTIVE       Koko Milton is a 43 year old  male with a PMH significant for:     Patient Active Problem List   Diagnosis     Dyslipidemia     ED (erectile dysfunction)     Tendinopathy of right rotator cuff     Sneezing with watery eyes     Type 2 diabetes mellitus with complication, without long-term current use of insulin (H)     He presents with for left-sided abdominal pain and a diabetes check.    Patient states his left-sided pain started a year ago and has gradually been getting worse.  It is currently gotten to the point where he cannot sleep on his left side.  He describes it as sharp and located over his lower left quadrant.  He denies any radiation.  The pain is worse with sitting for long periods of time.  The pains improved after bowel movement and with Tylenol.  Sometimes the pain will wake him up from sleep.  Patient has been struggling with constipation for the last few years.  He associates this to his glipizide use.    Patient's last diabetes check was 6 months ago.  He states he continues to take his meds regularly.  If he does not he developed symptoms.  Patient does not check his blood sugars and currently does not have a meter to do so.  His last eye exam by an optometrist 5/10/17.  He states he takes good care of his feet and checks them regularly.  He denies any numbness or tingling in his fingers or toes.  Any change in vision.  Patient needs refills on his medications today    PMH, Medications and Allergies were reviewed and updated as needed.        REVIEW OF SYSTEMS     CONSTITUTIONAL: NEGATIVE for fever, chills, change in weight  INTEGUMENTARY/SKIN: NEGATIVE for worrisome rashes, moles or lesions  EYES: NEGATIVE for vision changes   ENT/MOUTH: NEGATIVE for hearing changes  RESP: NEGATIVE for significant cough or SOB  CV: NEGATIVE for chest pain  GI: NEGATIVE for nausea, abdominal pain, heartburn POS constipation   : NEGATIVE for frequency, dysuria  NEURO: NEGATIVE for  weakness, dizziness or paresthesias  PSYCHIATRIC: NEGATIVE for changes in mood or affect        OBJECTIVE     Vitals:    02/21/18 1605   BP: 112/72   Pulse: 86   Temp: 98.5  F (36.9  C)   TempSrc: Oral   SpO2: 96%   Weight: 168 lb 6.4 oz (76.4 kg)     Body mass index is 24.66 kg/(m^2).    Constitutional: Awake, alert, cooperative, no apparent distress, and appears stated age.  Eyes: Lids and lashes normal, pupils equal, round and reactive to light, sclera clear, conjunctiva normal.  ENT: Normocephalic, without obvious abnormality, atramatic,  Neck: Supple, symmetrical, trachea midline, no adenopathy  Lungs: No increased work of breathing, good air exchange, clear to auscultation bilaterally, no crackles or wheezing.  Cardiovascular: Regular rate and rhythm, normal S1 and S2, no S3 or S4, and no murmur noted.  Abdomen: No scars, normal bowel sounds, soft, non-distended, tender to deep palpation of lower left quadrant, no masses palpated, no hepatosplenomegally. Negative Rebound   Musculoskeletal: Feet appear clean and dry, no skin lesions noted, no findings between toes, normal sentation throughout bottom of feet bilaterally   Neurologic: Awake, alert, oriented to name, place and time.  Cranial nerves II-XII are grossly intact.  Gait is normal.  Neuropsychiatric: Normal affect, mood, orientation, memory and insight.  Skin: No rashes, erythema, pallor, petechia or purpura.    No results found for this or any previous visit (from the past 24 hour(s)).    ASSESSMENT AND PLAN     Koko was seen today for recheck, other and refill request.    Diagnoses and all orders for this visit:    Constipation, unspecified constipation type  -     polyethylene glycol (MIRALAX) powder; Take 17 g (1 capful) by mouth daily as needed for constipation    Anxiety  -     sertraline (ZOLOFT) 50 MG tablet; Take 1 tablet (50 mg) by mouth daily    Type 2 diabetes mellitus with hyperglycemia, without long-term current use of insulin (H)  -      pravastatin (PRAVACHOL) 40 MG tablet; Take 1 tablet (40 mg) by mouth daily Needs appointment for further refills.  -     metFORMIN (GLUCOPHAGE) 1000 MG tablet; Take 1 tablet (1,000 mg) by mouth 2 times daily (with meals) Needs appointment for further refills.  -     glipiZIDE (GLUCOTROL XL) 2.5 MG 24 hr tablet; Take 1 tablet (2.5 mg) by mouth daily    Type 2 diabetes mellitus without complication, without long-term current use of insulin (H)  -     order for DME; Equipment being ordered: One Touch Ultra glucometer with lancets and test strips.  -     Hemoglobin A1c ([facility]); Future  -     Basic Metabolic Panel ([facility]); Future  -     Lipid Panel ([facility]); Future  - Foot exam completed today  -Patient states he does not have time to stop with the lab today but will do so fasting within the week    RTC this week for labs and in 3 months for follow up of diabetes or sooner if develops new or worsening symptoms.  I discussed the patient with Dr. Mireles who is in agreement with the assessment and plan.   Nydia Addison

## 2018-03-01 NOTE — PROGRESS NOTES
Preceptor attestation:  Patient seen and discussed with the resident. Assessment and plan reviewed with resident and agreed upon.  Supervising physician: Burton Mireles  [facility]

## 2018-03-23 ENCOUNTER — TELEPHONE (OUTPATIENT)
Dept: FAMILY MEDICINE | Facility: CLINIC | Age: 43
End: 2018-03-23

## 2018-03-23 DIAGNOSIS — F41.9 ANXIETY: ICD-10-CM

## 2018-03-23 DIAGNOSIS — E11.65 TYPE 2 DIABETES MELLITUS WITH HYPERGLYCEMIA, WITHOUT LONG-TERM CURRENT USE OF INSULIN (H): ICD-10-CM

## 2018-03-23 DIAGNOSIS — E11.9 TYPE 2 DIABETES MELLITUS WITHOUT COMPLICATION, WITHOUT LONG-TERM CURRENT USE OF INSULIN (H): ICD-10-CM

## 2018-03-23 DIAGNOSIS — E11.00 TYPE 2 DIABETES MELLITUS WITH HYPEROSMOLARITY WITHOUT COMA, WITHOUT LONG-TERM CURRENT USE OF INSULIN (H): ICD-10-CM

## 2018-03-23 LAB
ANION GAP SERPL CALCULATED.3IONS-SCNC: 9 MMOL/L (ref 5–18)
BUN SERPL-MCNC: 8 MG/DL (ref 8–22)
CALCIUM SERPL-MCNC: 9.3 MG/DL (ref 8.5–10.5)
CHLORIDE SERPL-SCNC: 99 MMOL/L (ref 98–107)
CHOLEST SERPL-MCNC: 224 MG/DL
CO2 SERPL-SCNC: 27 MMOL/L (ref 22–31)
CREAT SERPL-MCNC: 0.91 MG/DL (ref 0.7–1.3)
FASTING?: ABNORMAL
GLUCOSE SERPL-MCNC: 443 MG/DL (ref 70–125)
HBA1C MFR BLD: 11 % (ref 4.1–5.7)
HDLC SERPL-MCNC: 39 MG/DL
LDLC SERPL CALC-MCNC: 113 MG/DL
POTASSIUM SERPL-SCNC: 4.8 MMOL/L (ref 3.5–5)
SODIUM SERPL-SCNC: 135 MMOL/L (ref 136–145)
TRIGL SERPL-MCNC: 358 MG/DL

## 2018-03-23 RX ORDER — BLOOD-GLUCOSE METER
EACH MISCELLANEOUS
Qty: 1 KIT | Refills: 0 | Status: SHIPPED | OUTPATIENT
Start: 2018-03-23 | End: 2018-05-30

## 2018-03-23 NOTE — TELEPHONE ENCOUNTER
[facility] phone call message- patient requesting a refill:    Full Medication Name: metFORMIN (GLUCOPHAGE) 1000 MG tablet-Take 1 tablet (1,000 mg) by mouth 2 times daily (with meals) Needs appointment for further refills.- pravastatin (PRAVACHOL) 40 MG tablet-Take 1 tablet (40 mg) by mouth daily Needs appointment for further refills.- glipiZIDE (GLUCOTROL XL) 2.5 MG 24 hr tablet-Take 1 tablet (2.5 mg) by mouth daily- sertraline (ZOLOFT) 50 MG tablet-Take 1 tablet (50 mg) by mouth daily        Pharmacy confirmed as     [id number] - [place], [place] - [address] AT [facility] [address]  [place] [address]  Phone: [phone] Fax: [phone]  : Yes    Additional Comments: None      OK to leave a message on voice mail? Yes    Primary language: English      needed? No    Call taken on March 23, 2018 at 4:21 PM by Grisel Flores-Cardona

## 2018-03-24 NOTE — TELEPHONE ENCOUNTER
Received call from lab re: critical blood glucose 443  Sodium slightly low, likely because of blood glucose  Electrolytes and kidneys otherwise OK  A1c 11%    Patient reports no new symptoms today. Has had intermittent dizziness and fatigue x1 month, not acutely worsening. No abdominal pain, nausea, etc.    Taking metformin 1000mg BID and glipizide 2.5mg Qd, took meds today  Ran out of testing supplies    Suggested patient take another glipizide tonight  Start glipizide 5mg QD on Sunday  Continue Metformin  Be seen early next week for diabetes f/u  Meter supply refills sent to pharmacy, asked him to check daily and if >400 and new symptoms, needs to be seen or call with any questions    He expresses understanding and agreement with the plan    Carolyn Collier MD  PGY-2  Pager # [phone]

## 2018-03-25 RX ORDER — PRAVASTATIN SODIUM 40 MG
40 TABLET ORAL DAILY
Qty: 30 TABLET | Refills: 0 | Status: SHIPPED | OUTPATIENT
Start: 2018-03-25 | End: 2018-03-27

## 2018-03-25 RX ORDER — GLIPIZIDE 2.5 MG/1
2.5 TABLET, EXTENDED RELEASE ORAL DAILY
Qty: 90 TABLET | Refills: 1 | Status: SHIPPED | OUTPATIENT
Start: 2018-03-25 | End: 2018-03-27

## 2018-03-27 DIAGNOSIS — E11.65 TYPE 2 DIABETES MELLITUS WITH HYPERGLYCEMIA, WITHOUT LONG-TERM CURRENT USE OF INSULIN (H): ICD-10-CM

## 2018-03-27 DIAGNOSIS — F41.9 ANXIETY: ICD-10-CM

## 2018-03-27 RX ORDER — GLIPIZIDE 2.5 MG/1
2.5 TABLET, EXTENDED RELEASE ORAL DAILY
Qty: 90 TABLET | Refills: 1 | Status: SHIPPED | OUTPATIENT
Start: 2018-03-27 | End: 2019-01-16

## 2018-03-27 RX ORDER — PRAVASTATIN SODIUM 40 MG
40 TABLET ORAL DAILY
Qty: 30 TABLET | Refills: 0 | Status: SHIPPED | OUTPATIENT
Start: 2018-03-27 | End: 2018-05-26

## 2018-05-23 ENCOUNTER — TELEPHONE (OUTPATIENT)
Dept: FAMILY MEDICINE | Facility: CLINIC | Age: 43
End: 2018-05-23

## 2018-05-23 DIAGNOSIS — J30.2 ACUTE SEASONAL ALLERGIC RHINITIS, UNSPECIFIED TRIGGER: Primary | ICD-10-CM

## 2018-05-23 NOTE — TELEPHONE ENCOUNTER
Pharmacy request for Loratadine 10 mg tablets. It is not list on patient's med list please advise.    Melissa Nieves, CMA

## 2018-05-24 ENCOUNTER — RECORDS - HEALTHEAST (OUTPATIENT)
Dept: ADMINISTRATIVE | Facility: OTHER | Age: 43
End: 2018-05-24

## 2018-05-24 ENCOUNTER — OFFICE VISIT (OUTPATIENT)
Dept: FAMILY MEDICINE | Facility: CLINIC | Age: 43
End: 2018-05-24
Payer: COMMERCIAL

## 2018-05-24 VITALS
HEIGHT: 69 IN | BODY MASS INDEX: 24.14 KG/M2 | OXYGEN SATURATION: 93 % | DIASTOLIC BLOOD PRESSURE: 74 MMHG | TEMPERATURE: 97.8 F | RESPIRATION RATE: 16 BRPM | WEIGHT: 163 LBS | HEART RATE: 64 BPM | SYSTOLIC BLOOD PRESSURE: 108 MMHG

## 2018-05-24 DIAGNOSIS — F41.9 ANXIETY: ICD-10-CM

## 2018-05-24 DIAGNOSIS — E55.9 VITAMIN D DEFICIENCY: ICD-10-CM

## 2018-05-24 DIAGNOSIS — E11.8 TYPE 2 DIABETES MELLITUS WITH COMPLICATION, WITHOUT LONG-TERM CURRENT USE OF INSULIN (H): Primary | ICD-10-CM

## 2018-05-24 LAB
BUN SERPL-MCNC: 8.1 MG/DL (ref 7–21)
CALCIUM SERPL-MCNC: 9.6 MG/DL (ref 8.5–10.1)
CHLORIDE SERPLBLD-SCNC: 94.8 MMOL/L (ref 98–110)
CHOLEST SERPL-MCNC: 239 MG/DL (ref 0–200)
CHOLEST/HDLC SERPL: 6.8 {RATIO} (ref 0–5)
CO2 SERPL-SCNC: 26.7 MMOL/L (ref 20–32)
CREAT SERPL-MCNC: 0.5 MG/DL (ref 0.7–1.3)
CREAT UR-MCNC: 66.9 MG/DL
GFR SERPL CREATININE-BSD FRML MDRD: >90 ML/MIN/1.7 M2
GLUCOSE SERPL-MCNC: 386.6 MG'DL (ref 70–99)
HBA1C MFR BLD: 11 % (ref 4.1–5.7)
HDLC SERPL-MCNC: 35.1 MG/DL
LDLC SERPL DIRECT ASSAY-MCNC: 97 MG/DL
MICROALBUMIN UR-MCNC: 0.92 MG/DL (ref 0–1.99)
MICROALBUMIN/CREAT UR: 13.8 MG/G
POTASSIUM SERPL-SCNC: 4.1 MMOL/DL (ref 3.2–4.6)
SODIUM SERPL-SCNC: 130.4 MMOL/L (ref 132–142)
TRIGL SERPL-MCNC: 606.8 MG/DL (ref 0–150)
TSH SERPL DL<=0.05 MIU/L-ACNC: 1.7 UIU/ML (ref 0.3–5)
VLDL CHOLESTEROL: 121.4 MG/DL (ref 7–32)

## 2018-05-24 RX ORDER — LORATADINE 10 MG/1
10 TABLET ORAL DAILY
Qty: 90 TABLET | Refills: 1 | Status: SHIPPED | OUTPATIENT
Start: 2018-05-24 | End: 2019-06-18

## 2018-05-24 NOTE — PATIENT INSTRUCTIONS
Your A1c is not in target. Target should be 7.0, because you are a healthy young man. You should have diabetes education, we will call about this. You need to check you sugars at least once a day. Your A1c was 11.0 today. Will follow-up in 2 months. Will make a decision to start a different medication at our follow-up. Thank you for considering insulin as a treatment. Insulin therapy can be temporary for 3-6 months. Once you are in better control, we can continue only pills for your diabetes.    Instructed to start taken diabetes medication once Ramadan is done and  Can take 2 of glipizide( 2.5 Mg) xl    Referral sent to  [facility].  They will contact patient to schedule.  Rebekah  05/24/18    Per  [facility], patient no showed appointment.  Sayda Salas  7/2/18

## 2018-05-24 NOTE — MR AVS SNAPSHOT
"              After Visit Summary   5/24/2018    Koko Milton    MRN: [id number]           Patient Information     Date Of Birth          1975        Visit Information        Provider Department      5/24/2018 8:00 AM Burton Mireles MD [facility]        Today's Diagnoses     Type 2 diabetes mellitus with complication, without long-term current use of insulin (H)    -  1      Care Instructions    Your A1c is not in target. Target should be 7.0, because you are a healthy young man. You should have diabetes education, we will call about this. You need to check you sugars at least once a day. Your A1c was 11.0 today. Will follow-up in 2 months. Will make a decision to start a different medication at our follow-up. Thank you for considering insulin as a treatment. Insulin therapy can be temporary for 3-6 months. Once you are in better control, we can continue only pills for your diabetes.           Follow-ups after your visit        Who to contact     Please call your clinic at [phone] to:    Ask questions about your health    Make or cancel appointments    Discuss your medicines    Learn about your test results    Speak to your doctor            Additional Information About Your Visit        Care EveryWhere ID     This is your Care EveryWhere ID. This could be used by other organizations to access your [facility] medical records  [id number]        Your Vitals Were     Pulse Temperature Respirations Height Pulse Oximetry BMI (Body Mass Index)    64 97.8  F (36.6  C) (Oral) 16 5' 9.13\" (175.6 cm) 93% 23.98 kg/m2       Blood Pressure from Last 3 Encounters:   05/24/18 108/74   02/21/18 112/72   05/11/17 110/75    Weight from Last 3 Encounters:   05/24/18 163 lb (73.9 kg)   02/21/18 168 lb 6.4 oz (76.4 kg)   05/11/17 174 lb 12.8 oz (79.3 kg)              We Performed the Following     Basic Metabolic Panel ([place])     Hemoglobin A1c ([facility])     Lipid Panel ([place])     Microalbumin Creatinine Ratio " Random Ur ([facility])     TSH  Sensitive ([facility])     Vitamin D 25-Hydroxy ([facility])        Primary Care Provider Office Phone # Fax #    Vijaya Rosa Mayo [phone]       [facility] [address] [place] [address]        Equal Access to Services     ARACELI BERG : Hadii sarai ye hadasho Soomaali, waaxda luqadaha, qaybta kaalmada adeegyada, waxana maria richardleydishanell rodríguez. So [facility] [phone].    ATENCIÓN: Si habla español, tiene a bentley disposición servicios gratuitos de asistencia lingüística. Llame al [phone].    We comply with applicable federal civil rights laws and [place] laws. We do not discriminate on the basis of race, color, national origin, age, disability, sex, sexual orientation, or gender identity.            Thank you!     Thank you for choosing [facility]  for your care. Our goal is always to provide you with excellent care. Hearing back from our patients is one way we can continue to improve our services. Please take a few minutes to complete the written survey that you may receive in the mail after your visit with us. Thank you!             Your Updated Medication List - Protect others around you: Learn how to safely use, store and throw away your medicines at www.disposemymeds.org.          This list is accurate as of 5/24/18  9:01 AM.  Always use your most recent med list.                   Brand Name Dispense Instructions for use Diagnosis    * ACCU-CHEK COMPLETE Kit     1 Units    1 Units daily    Type 2 diabetes mellitus (H)       * blood glucose monitoring meter device kit     1 kit    Use to test blood sugars 4 times daily or as directed.    Type 2 diabetes mellitus with hyperosmolarity without coma, without long-term current use of insulin (H)       blood glucose monitoring test strip    ONETOUCH ULTRA    2 Box    Use to test blood sugars 4 times daily or as directed.    Diabetes mellitus, type 2 (H)       glipiZIDE 2.5 MG  24 hr tablet    GLUCOTROL XL    90 tablet    Take 1 tablet (2.5 mg) by mouth daily    Type 2 diabetes mellitus with hyperglycemia, without long-term current use of insulin (H)       metFORMIN 1000 MG tablet    GLUCOPHAGE    60 tablet    Take 1 tablet (1,000 mg) by mouth 2 times daily (with meals) Needs appointment for further refills.    Type 2 diabetes mellitus with hyperglycemia, without long-term current use of insulin (H)       order for DME     1 each    Equipment being ordered: One Touch Ultra glucometer with lancets and test strips.    Type 2 diabetes mellitus without complication, without long-term current use of insulin (H)       polyethylene glycol powder    MIRALAX    510 g    Take 17 g (1 capful) by mouth daily as needed for constipation    Constipation, unspecified constipation type       pravastatin 40 MG tablet    PRAVACHOL    30 tablet    Take 1 tablet (40 mg) by mouth daily Needs appointment for further refills.    Type 2 diabetes mellitus with hyperglycemia, without long-term current use of insulin (H)       sertraline 50 MG tablet    ZOLOFT    30 tablet    Take 1 tablet (50 mg) by mouth daily    Anxiety       * Notice:  This list has 2 medication(s) that are the same as other medications prescribed for you. Read the directions carefully, and ask your doctor or other care provider to review them with you.

## 2018-05-24 NOTE — PROGRESS NOTES
"S: Koko Milton is a 43 year old male who returns for follow up of  Diabetes, much much care    Does not check sugars   no diabetes education   On glipizide and metformin  Patients states that main concern today is diabtes check.    PMHX/PSHX/MEDS/ALLERGIES/SHX/FHX reviewed and updated in Epic.      ROS:  General: No fevers, chills  Head: No headache  Ears: No acute change in hearing.    CV: No chest pain or palpitations.  Resp: No shortness of breath.  No cough. No hemoptysis.  GI: No nausea, vomiting, constipation, diarrhea  : No urinary pains    O: /74  Pulse 64  Temp 97.8  F (36.6  C) (Oral)  Resp 16  Ht 5' 9.13\" (175.6 cm)  Wt 163 lb (73.9 kg)  SpO2 93%  BMI 23.98 kg/m2   Gen:  Well nourished and in NAD  HEENT: PERRLA; TMs normal color and landmarks; nasopharynx pink and moist; oropharynx pink and moist  Neck: supple without lymphadenopathy  CV:  RRR  - no murmurs, rubs, or gallups,   Pulm:  CTAB, no wheezes/rales/rhonchi, good air entry     ABD: soft, nontender, no masses, no rebound, BS intact throughout  Extrem: no cyanosis, edema or clubbing  Psych: Euthymic      (E11.8) Type 2 diabetes mellitus with complication, without long-term current use of insulin (H)  (primary encounter diagnosis)  Comment:  Needs labs and  Education referrall  Plan: Hemoglobin A1c ([facility]), Basic Metabolic Panel         ([place]), Microalbumin Creatinine Ratio         Random Ur ([facility]), Vitamin D 25-Hydroxy         ([facility]), Lipid Panel ([place]), TSH          Sensitive ([facility])   Increase glipizide xl to 5 mg per day   now in Ramadan season and not taken much meds for diabetes  2 refill medications   Fu after diabetes education             RTC in 8 to 12 weeks, for follow up of Diabetes or sooner if develops new or worsening symptoms.    Burton Mireles"

## 2018-05-24 NOTE — LETTER
May 29, 2018      Koko [address]  [place] [address]        Dear Koko,  Your vitamin D is low/ I prescribed vitamin d supplement to [facility], please go  the medicine and start to take it.   Make sure you come back to clinic in 2 months to reechek your diabetes which  Is not in control, you will taken 2 months of 5 mg of glipizide XL and metformin 100 mg twice a  day   Please see below for your test results.    Resulted Orders   Hemoglobin A1c ([facility] )   Result Value Ref Range    Hemoglobin A1C 11.0 (H) 4.1 - 5.7 %   Basic Metabolic Panel ([place])   Result Value Ref Range    Urea Nitrogen 8.1 7.0 - 21.0 mg/dL    Calcium 9.6 8.5 - 10.1 mg/dL    Chloride 94.8 (L) 98.0 - 110.0 mmol/L    Carbon Dioxide 26.7 20.0 - 32.0 mmol/L    Creatinine 0.5 (L) 0.7 - 1.3 mg/dL    Glucose 386.6 (H) 70.0 - 99.0 mg'dL    Potassium 4.1 3.2 - 4.6 mmol/dL    Sodium 130.4 (L) 132.0 - 142.0 mmol/L    GFR Estimate >90 >60.0 mL/min/1.7 m2    GFR Estimate If Black >90 >60.0 mL/min/1.7 m2   Microalbumin Creatinine Ratio Random Ur ([facility])   Result Value Ref Range    Microalbumin, Urine 0.92 0.00 - 1.99 mg/dL    Creatinine, Urine 66.9 mg/dL    Albumin Urine mg/g Cr 13.8 <=19.9 mg/g    Narrative    Test performed by:  [facility]  [address] [place] [address]  Microalbumin, Random Urine  <2.0 mg/dL . . . . . . . . Normal  3.0-30.0 mg/dL . . . . . . Microalbuminuria  >30.0 mg/dL . . . . . .  . Clinical Proteinuria  Microalbumin/Creatinine Ratio, Random Urine  <20 mg/g . . . . .. . . . Normal   mg/g . . . . . . . Microalbuminuria  >300 mg/g . . . . . . . . Clinical Proteinuria   Vitamin D 25-Hydroxy ([facility])   Result Value Ref Range    Vitamin D,25-Hydroxy 16.1 (L) 30.0 - 80.0 ng/mL    Narrative    Test performed by:  [facility]  [address] [place] [address]  Deficiency <10.0 ng/mL  Insufficiency 10.0-29.9 ng/mL  Sufficiency 30.0-80.0 ng/mL  Toxicity  (possible) >100.0 ng/mL   Lipid Panel ([place])   Result Value Ref Range    Cholesterol 239.0 (H) 0.0 - 200.0 mg/dL    Cholesterol/HDL Ratio 6.8 (H) 0.0 - 5.0    HDL Cholesterol 35.1 (L) >40.0 mg/dL    Triglycerides 606.8 (H) 0.0 - 150.0 mg/dL    VLDL Cholesterol 121.4 (H) 7.0 - 32.0 mg/dL    Narrative    Triglycerides were >400 mg/dL, unable to calculate the LDL   TSH  Sensitive ([facility])   Result Value Ref Range    TSH 1.70 0.30 - 5.00 uIU/mL    Narrative    Test performed by:  [facility]  [address] [place] [address]   LDL Cholesterol  Direct ([facility])   Result Value Ref Range    LDL Cholesterol Direct 97 <=129 mg/dl    Narrative    Test performed by:  [facility] [address] [place] [address]       If you have any questions, please call the clinic to make an appointment.    Sincerely,    Burton Mireles MD

## 2018-05-25 ENCOUNTER — TELEPHONE (OUTPATIENT)
Dept: FAMILY MEDICINE | Facility: CLINIC | Age: 43
End: 2018-05-25

## 2018-05-25 ENCOUNTER — COMMUNICATION - HEALTHEAST (OUTPATIENT)
Dept: ADMINISTRATIVE | Facility: CLINIC | Age: 43
End: 2018-05-25

## 2018-05-25 DIAGNOSIS — E11.65 TYPE 2 DIABETES MELLITUS WITH HYPERGLYCEMIA, WITHOUT LONG-TERM CURRENT USE OF INSULIN (H): ICD-10-CM

## 2018-05-25 LAB — 25(OH)D3 SERPL-MCNC: 16.1 NG/ML (ref 30–80)

## 2018-05-25 NOTE — TELEPHONE ENCOUNTER
[facility] phone call message- patient requesting a refill:    Full Medication Name: PRAVASTATIN    Dose: 40MG    Pharmacy confirmed as   [email or web address] - [place], [place] - [address] AT [facility]  [address] [place] [address]  Phone: [phone] Fax: [phone]    [email or web address] - [place] - [address] AT [facility] [address]  [address] [place], [place] [address]  [place] [address]  Phone: [phone] Fax: [phone]  : Yes    Additional Comments:he is out and needs a refill.     OK to leave a message on voice mail? Yes    Primary language: English      needed? No    Call taken on May 25, 2018 at 2:15 PM by Shaye Rand

## 2018-05-26 RX ORDER — PRAVASTATIN SODIUM 40 MG
40 TABLET ORAL DAILY
Qty: 90 TABLET | Refills: 3 | Status: SHIPPED | OUTPATIENT
Start: 2018-05-26 | End: 2019-06-18

## 2018-05-28 RX ORDER — CHOLECALCIFEROL (VITAMIN D3) 50 MCG
2000 TABLET ORAL DAILY
Qty: 100 TABLET | Refills: 3 | Status: SHIPPED | OUTPATIENT
Start: 2018-05-28 | End: 2019-06-18

## 2018-05-29 ENCOUNTER — TELEPHONE (OUTPATIENT)
Dept: FAMILY MEDICINE | Facility: CLINIC | Age: 43
End: 2018-05-29

## 2018-05-29 DIAGNOSIS — E11.9 TYPE 2 DIABETES MELLITUS WITHOUT COMPLICATION, WITHOUT LONG-TERM CURRENT USE OF INSULIN (H): ICD-10-CM

## 2018-05-29 DIAGNOSIS — E11.00 TYPE 2 DIABETES MELLITUS WITH HYPEROSMOLARITY WITHOUT COMA, WITHOUT LONG-TERM CURRENT USE OF INSULIN (H): ICD-10-CM

## 2018-05-29 NOTE — TELEPHONE ENCOUNTER
[facility] phone call message- patient requesting a refill:    Full Medication Name: blood glucose monitoring (ACCU-CHEK CINDY PLUS) meter device kit - Use to test blood sugars 4 times daily or as directed. And blood glucose monitoring (ONE TOUCH ULTRA) test strip -Use to test blood sugars 4 times daily or as directed.       Pharmacy confirmed as   [id number] - [place], [place] - [address] AT [facility] [address]  [place] [address]  Phone: [phone] Fax: [phone]  : Yes    Additional Comments: none     OK to leave a message on voice mail? Yes    Primary language: English      needed? No    Call taken on May 29, 2018 at 4:47 PM by Tiffany Paris

## 2018-05-30 RX ORDER — BLOOD-GLUCOSE METER
EACH MISCELLANEOUS
Qty: 1 KIT | Refills: 0 | Status: SHIPPED | OUTPATIENT
Start: 2018-05-30 | End: 2020-02-13

## 2018-06-11 ENCOUNTER — OFFICE VISIT - HEALTHEAST (OUTPATIENT)
Dept: EDUCATION SERVICES | Facility: CLINIC | Age: 43
End: 2018-06-11

## 2018-06-11 DIAGNOSIS — E11.8 DIABETES MELLITUS WITH COMPLICATION (H): ICD-10-CM

## 2018-06-25 ENCOUNTER — COMMUNICATION - HEALTHEAST (OUTPATIENT)
Dept: ADMINISTRATIVE | Facility: CLINIC | Age: 43
End: 2018-06-25

## 2019-01-16 ENCOUNTER — OFFICE VISIT (OUTPATIENT)
Dept: FAMILY MEDICINE | Facility: CLINIC | Age: 44
End: 2019-01-16
Payer: COMMERCIAL

## 2019-01-16 VITALS
HEART RATE: 77 BPM | RESPIRATION RATE: 16 BRPM | WEIGHT: 161.8 LBS | HEIGHT: 72 IN | SYSTOLIC BLOOD PRESSURE: 114 MMHG | BODY MASS INDEX: 21.91 KG/M2 | DIASTOLIC BLOOD PRESSURE: 77 MMHG | OXYGEN SATURATION: 95 % | TEMPERATURE: 97.7 F

## 2019-01-16 DIAGNOSIS — E55.9 VITAMIN D DEFICIENCY: ICD-10-CM

## 2019-01-16 LAB
CHOLEST SERPL-MCNC: 179.6 MG/DL (ref 0–200)
CHOLEST/HDLC SERPL: 4.6 {RATIO} (ref 0–5)
HBA1C MFR BLD: 10.8 % (ref 4.1–5.7)
HDLC SERPL-MCNC: 39.3 MG/DL
LDLC SERPL CALC-MCNC: 89 MG/DL (ref 0–129)
TRIGL SERPL-MCNC: 258.3 MG/DL (ref 0–150)
VLDL CHOLESTEROL: 51.7 MG/DL (ref 7–32)

## 2019-01-16 RX ORDER — INSULIN GLARGINE 100 [IU]/ML
10 INJECTION, SOLUTION SUBCUTANEOUS AT BEDTIME
Qty: 9 ML | Refills: 3 | Status: SHIPPED | OUTPATIENT
Start: 2019-01-16 | End: 2019-06-18

## 2019-01-16 ASSESSMENT — MIFFLIN-ST. JEOR: SCORE: 1661.92

## 2019-01-16 NOTE — PROGRESS NOTES
S: Koko Milton is a 44 year old male who returns for follow up of  diabetes type 2 without eye or kidney  complications He was seen in clinic 8 months ago  Feels well,works as , gets up very early   On metformin 2000  mg and glipizide 2. 5 mg   At times she does not glipizide gipizide    Needs Carbcounting  Patients states that main concern today is diabetes fu    PMHX/PSHX/MEDS/ALLERGIES/SHX/FHX reviewed and updated in Epic.      ROS:  General: No fevers, chills  Head: No headache  Ears: No acute change in hearing.    CV: No chest pain or palpitations.  Resp: No shortness of breath.  No cough. No hemoptysis.  GI: No nausea, vomiting, constipation, diarrhea  : No urinary pains    O: /77 (BP Location: Left arm, Patient Position: Sitting, Cuff Size: Adult Regular)   Pulse 77   Temp 97.7  F (36.5  C) (Oral)   Resp 16   Ht 1.829 m (6')   Wt 73.4 kg (161 lb 12.8 oz)   SpO2 95%   BMI 21.94 kg/m     Gen:  Well nourished and in NAD    Neck: supple without lymphadenopathy  CV:  RRR  - no murmurs, rubs, or gallups,   Pulm:  CTAB, no wheezes/rales/rhonchi, good air entry    central obesity  ABD: soft, nontender, no masses, no rebound, BS intact throughout  Extrem: no cyanosis, edema or clubbing  Psych: Euthymic    Feet exam: protective sensation  1 2 Diabetes type 2 A1C 11 in 3/18 ans now 10.8   Continue Metformin 2000mg    discontinue  glipizide   start Lantuss 10 units at night  time( )/ follow-up 1 to 2 weeks to monitor insuline Treatment)Had No albuminuria   No retinopathy   Diaebtes education  as soon as posible  A1C ever 3 months    2 Dyslipidemia:  Lipid panel an vitamin d level today  3 Vitamin d deficiency: level today at risk for vitamin d deficiency  RTC in 2 weeks, for follow up of diabetes acre/monitor insuline treatmetn or sooner if develops new or worsening symptoms.    Burton Mireles

## 2019-01-16 NOTE — PROGRESS NOTES
Requested by Dr. Mireles to provide new-start insulin teaching to patient.  Educated patient to wash hands before/after use, clean area with alcohol swab, and to prime insulin pen 2 units before each use to avoid air bubbles.  Also educated patient to rotate injection site on abdomen and to be an inch away from the belly button. Visually showed the patient with kwasi pen and then utilized the teach-back method. Provided instructions to discard used needles after each use into a sharps container. Patient feels comfortable to administer insulin to himself.      Patient was also referred to receive diabetes education from Esther/Li and to schedule an appointment per Dr. Mireles - he agreed to do so.       Katherine Coleman, PharmD Resident

## 2019-01-16 NOTE — PATIENT INSTRUCTIONS
-Start injecting 10 units of Basaglar insulin every night  -Stop taking Glipizide  -Call the clinic to schedule an appointment with a Diabetes Educator (Li or Esther) as soon as possible [phone]

## 2019-01-17 LAB — 25(OH)D3 SERPL-MCNC: 28.5 NG/ML (ref 30–80)

## 2019-01-17 NOTE — PROGRESS NOTES
I have verified the content of the note, which accurately reflects my assessment of the patient and the plan of care.   Tonya Vazquez, PharmD

## 2019-01-22 ENCOUNTER — ALLIED HEALTH/NURSE VISIT (OUTPATIENT)
Dept: FAMILY MEDICINE | Facility: CLINIC | Age: 44
End: 2019-01-22
Payer: COMMERCIAL

## 2019-01-22 DIAGNOSIS — E11.9 DIABETES MELLITUS (H): Primary | ICD-10-CM

## 2019-01-22 DIAGNOSIS — E11.9 TYPE 2 DIABETES MELLITUS WITHOUT COMPLICATION, WITHOUT LONG-TERM CURRENT USE OF INSULIN (H): ICD-10-CM

## 2019-01-22 NOTE — TELEPHONE ENCOUNTER
Plan does not cover Contour next strips , alternatives are One Touch Ultra Blue . Please send new RX to pt pharmacy. Thank you!     Alie Guzman MA

## 2019-01-22 NOTE — NURSING NOTE
"Diabetic Education RN Note for Nutrition  Meeting lasted: 30 mins  Patient current diet:   Patient states that his BS have been running in the 200's since he started his insulin over the weekend.  This is his first few days of using insulin and he states that he has no questions or concerns about how/where to inject it.    Patient described his diet as having a latte along with some form of bread in the morning.  For lunch he describes having fruits (mangos, oranges) along with some protein type shakes along with other \"snakes\".  For supper he describes some vegetables, along with starches like fries. He does not like beef and has decreased the amount of goat that he eats. He states that he drinks about a half gallon of 2% milk/ day and especially after he exercises.  He also states that he likes to eat dates.    He does exercise very often and was congratulated for that.  Discussed what is diabetes and the disease process. Highlighted the importance of eating regular meals. Discussed the benefits of increasing water intake. Discussed portion sizes. Discussed how to do foot care. Discussed avoiding all sugared drinks. Basic meal planning ideas handout was given to the patient and a guide to regular physical activity. (handouts were given to patient on these topics).  Reviewed with him how milk is considered a carb and how that works into the amount of carb servings per meal he should be consuming.  Pt verbalized understanding of education given today.  Advised patient to call the clinic with any questions or concerns.   Patient goals:  To increase the amount of water/day that he drinks  Take blood sugars every morning and log/track them  To start to be aware of the amount of carbs he is eating per day and try to lower portion sizes.    YARELIS Alonso RN      "

## 2019-06-18 ENCOUNTER — OFFICE VISIT (OUTPATIENT)
Dept: FAMILY MEDICINE | Facility: CLINIC | Age: 44
End: 2019-06-18
Payer: COMMERCIAL

## 2019-06-18 VITALS
TEMPERATURE: 98.1 F | WEIGHT: 163.8 LBS | OXYGEN SATURATION: 97 % | BODY MASS INDEX: 22.22 KG/M2 | HEART RATE: 70 BPM | RESPIRATION RATE: 18 BRPM | SYSTOLIC BLOOD PRESSURE: 114 MMHG | DIASTOLIC BLOOD PRESSURE: 70 MMHG

## 2019-06-18 DIAGNOSIS — K59.04 CHRONIC IDIOPATHIC CONSTIPATION: ICD-10-CM

## 2019-06-18 DIAGNOSIS — Z00.00 ENCOUNTER FOR PREVENTIVE CARE: ICD-10-CM

## 2019-06-18 DIAGNOSIS — Z23 NEED FOR VACCINATION: ICD-10-CM

## 2019-06-18 DIAGNOSIS — E55.9 VITAMIN D DEFICIENCY: ICD-10-CM

## 2019-06-18 DIAGNOSIS — F41.9 ANXIETY: ICD-10-CM

## 2019-06-18 DIAGNOSIS — E11.8 TYPE 2 DIABETES MELLITUS WITH COMPLICATION, WITHOUT LONG-TERM CURRENT USE OF INSULIN (H): Primary | ICD-10-CM

## 2019-06-18 DIAGNOSIS — E11.65 TYPE 2 DIABETES MELLITUS WITH HYPERGLYCEMIA, WITHOUT LONG-TERM CURRENT USE OF INSULIN (H): ICD-10-CM

## 2019-06-18 DIAGNOSIS — J30.2 ACUTE SEASONAL ALLERGIC RHINITIS: ICD-10-CM

## 2019-06-18 LAB
BUN SERPL-MCNC: 7.1 MG/DL (ref 7–21)
CALCIUM SERPL-MCNC: 9.2 MG/DL (ref 8.5–10.1)
CHLORIDE SERPLBLD-SCNC: 97.3 MMOL/L (ref 98–110)
CO2 SERPL-SCNC: 26.8 MMOL/L (ref 20–32)
CREAT SERPL-MCNC: 0.6 MG/DL (ref 0.7–1.3)
CREAT UR-MCNC: 38.7 MG/DL
GFR SERPL CREATININE-BSD FRML MDRD: >90 ML/MIN/1.7 M2
GLUCOSE SERPL-MCNC: 503.7 MG'DL (ref 70–99)
HBA1C MFR BLD: 9.4 % (ref 4.1–5.7)
HIV 1+2 AB+HIV1 P24 AG SERPL QL IA: NEGATIVE
MICROALBUMIN UR-MCNC: 0.59 MG/DL (ref 0–1.99)
MICROALBUMIN/CREAT UR: 15.2 MG/G
POTASSIUM SERPL-SCNC: 4.6 MMOL/DL (ref 3.2–4.6)
SODIUM SERPL-SCNC: 133.8 MMOL/L (ref 132–142)

## 2019-06-18 RX ORDER — PRAVASTATIN SODIUM 40 MG
40 TABLET ORAL DAILY
Qty: 90 TABLET | Refills: 3 | Status: SHIPPED | OUTPATIENT
Start: 2019-06-18 | End: 2020-02-13

## 2019-06-18 RX ORDER — CHOLECALCIFEROL (VITAMIN D3) 50 MCG
2000 TABLET ORAL DAILY
Qty: 100 TABLET | Refills: 3 | Status: SHIPPED | OUTPATIENT
Start: 2019-06-18 | End: 2020-02-13

## 2019-06-18 RX ORDER — DOCUSATE SODIUM 100 MG/1
100 CAPSULE, LIQUID FILLED ORAL 2 TIMES DAILY
Qty: 180 CAPSULE | Refills: 3 | Status: SHIPPED | OUTPATIENT
Start: 2019-06-18 | End: 2020-02-13

## 2019-06-18 RX ORDER — INSULIN GLARGINE 100 [IU]/ML
15 INJECTION, SOLUTION SUBCUTANEOUS AT BEDTIME
Qty: 9 ML | Refills: 3 | Status: SHIPPED | OUTPATIENT
Start: 2019-06-18 | End: 2019-06-19

## 2019-06-18 RX ORDER — LORATADINE 10 MG/1
10 TABLET ORAL DAILY
Qty: 90 TABLET | Refills: 3 | Status: SHIPPED | OUTPATIENT
Start: 2019-06-18 | End: 2021-05-14

## 2019-06-18 RX ORDER — ESCITALOPRAM OXALATE 10 MG/1
10 TABLET ORAL DAILY
Qty: 30 TABLET | Refills: 1 | Status: SHIPPED | OUTPATIENT
Start: 2019-06-18 | End: 2019-08-19

## 2019-06-18 NOTE — LETTER
June 19, 2019      Koko Milton  [address]  [place] [address]        Dear Koko,    Thank you for getting your care at [facility]. Please see below for your test results.  Your kidneys are OK.   Your sugar was very high - that is why I called you and we talked about starting more insulin.  Your urine did not show damage from the diabetes.   Your HIV was negative.     Resulted Orders   Basic Metabolic Panel ([place])   Result Value Ref Range    Urea Nitrogen 7.1 7.0 - 21.0 mg/dL    Calcium 9.2 8.5 - 10.1 mg/dL    Chloride 97.3 (L) 98.0 - 110.0 mmol/L    Carbon Dioxide 26.8 20.0 - 32.0 mmol/L    Creatinine 0.6 (L) 0.7 - 1.3 mg/dL    Glucose 503.7 (HH) 70.0 - 99.0 mg'dL    Potassium 4.6 3.2 - 4.6 mmol/dL    Sodium 133.8 132.0 - 142.0 mmol/L    GFR Estimate >90 >60.0 mL/min/1.7 m2    GFR Estimate If Black >90 >60.0 mL/min/1.7 m2    Narrative    Critical Value was reported to and read back by Dr. Gallardo    at 6/18/2019 3:56   PM. ROSA ELENA Arroyo   Microalbumin Creatinine Ratio Random Ur ([facility])   Result Value Ref Range    Microalbumin, Urine 0.59 0.00 - 1.99 mg/dL    Creatinine, Urine 38.7 mg/dL    Albumin Urine mg/g Cr 15.2 <=19.9 mg/g    Narrative    Test performed by:  [facility]  [address] [place] [address]  Microalbumin, Random Urine  <2.0 mg/dL . . . . . . . . Normal  3.0-30.0 mg/dL . . . . . . Microalbuminuria  >30.0 mg/dL . . . . . .  . Clinical Proteinuria  Microalbumin/Creatinine Ratio, Random Urine  <20 mg/g . . . . .. . . . Normal   mg/g . . . . . . . Microalbuminuria  >300 mg/g . . . . . . . . Clinical Proteinuria   HIV Ag/Ab Screen [place] ([facility])   Result Value Ref Range    HIV Antigen/Antibody Negative Negative    Narrative    Test performed by:  [facility]  [address] [place] [address]  Method is Abbott HIV Ag/Ab for the detection of HIV p24 antigen, HIV-1   antibodies and HIV-2 antibodies.   Hemoglobin A1c ([facility])   Result Value Ref Range     Hemoglobin A1C 9.4 (H) 4.1 - 5.7 %       If you have any concerns about these results please call and leave a message for me or send a MyChart message to the clinic.    Sincerely,    Nerissa Gallardo MD

## 2019-06-18 NOTE — PATIENT INSTRUCTIONS
Here is the plan from today's visit    1. Type 2 diabetes mellitus with complication, without long-term current use of insulin (H) - I will send you the results in a letter.   Your diabetes test is better!! The exercise is helping and eating less sugar and sweets is helping. Less rice.   - Basic Metabolic Panel ([facility])  - Microalbumin Creatinine Ratio Random Ur ([facility])  - Hemoglobin A1c ([facility])    2. Encounter for preventive care  - HIV Ag/Ab Screen [place] ([facility])    3. Need for vaccination  - ADMIN VACCINE, INITIAL  - TDAP VACCINE (BOOSTRIX)    4. Chronic idiopathic constipation  Drink 2 Liters of water a day.    - docusate sodium (COLACE) 100 MG capsule; Take 1 capsule (100 mg) by mouth 2 times daily  Dispense: 180 capsule; Refill: 3    5. Anxiety  Start the Lexapro.  Take it every day.  If it works, we can increase the dose later.   - escitalopram (LEXAPRO) 10 MG tablet; Take 1 tablet (10 mg) by mouth daily  Dispense: 30 tablet; Refill: 1    6. Type 2 diabetes mellitus with hyperglycemia, without long-term current use of insulin (H)  Keep taking the Metformin  Keep taking the 15 Units of Insulin  Come back after 7/1 to meet with the pharmacist to start the GLP -1 medicine to help with your appetite, weight and sugars.   - metFORMIN (GLUCOPHAGE) 1000 MG tablet; Take 1 tablet (1,000 mg) by mouth 2 times daily (with meals) Needs appointment for further refills.  Dispense: 180 tablet; Refill: 3  - pravastatin (PRAVACHOL) 40 MG tablet; Take 1 tablet (40 mg) by mouth daily  Dispense: 90 tablet; Refill: 3    7. Acute seasonal allergic rhinitis  - loratadine (CLARITIN) 10 MG tablet; Take 1 tablet (10 mg) by mouth daily  Dispense: 90 tablet; Refill: 3    8. Uncontrolled diabetes mellitus type 2 without complications (H)  - insulin glargine (BASAGLAR KWIKPEN) 100 UNIT/ML pen; Inject 15 Units Subcutaneous At Bedtime Or as directed  Dispense: 9 mL; Refill: 3  - insulin pen needle (32G X 6 MM) 32G X 6 MM  miscellaneous; Use pen needles daily or as directed with insulin.  Dispense: 100 each; Refill: 3    9. Vitamin D deficiency  - vitamin D3 (CHOLECALCIFEROL) 2000 units (50 mcg) tablet; Take 1 tablet (2,000 Units) by mouth daily  Dispense: 100 tablet; Refill: 3      Please call or return to clinic if your symptoms don't go away.    Follow up plan  Return after 7/1.  With the Pharmacist.     Nerissa Gallardo MD

## 2019-06-18 NOTE — PROGRESS NOTES
a1c         HPI       Koko is a 44 year old  male  who presents:    Chief Complaint   Patient presents with     Diabetes     come here today for regular follow up on diabetes per patient.      Refill Request     also need refill on some medications per patient.      Pain     sometimes have pain on the left side per patient.      Medication Reconciliation     reviewed.      DM: last time here was 2019. Diagnosed about 7 years ago.   Notes that he is doing well.  Walks every day 5 - 10 miles.  Diet changed to decreased meat and rice. Increased vegtables and fish.   Other than Ramadan - then did not go to the gym.  Has not gone back to the gym yet.  While he was there he did well.   Checks: 160 - 200 - these are fasting levels.   After eatin - 400.      2019 - had eye exam - nl    Insulin: increased to 15 by himself due to continued higher numbers.     ROS: denies CP, SOB or lows. Feels fine today and denies any other symptoms     Constipation: is drinking 2 glasses of water a day.  Abdominal pain - worse when he lays on the left side.  Worried that this is his kidneys but also aware that he has constipation and is likely that.     Meds: zoloft causes some nausea and teeth grinding. Family issues. Takes this prn.     Wt Readings from Last 4 Encounters:   19 74.3 kg (163 lb 12.8 oz)   19 73.4 kg (161 lb 12.8 oz)   18 73.9 kg (163 lb)   18 76.4 kg (168 lb 6.4 oz)             Patient Active Problem List   Diagnosis     Dyslipidemia     ED (erectile dysfunction)     Tendinopathy of right rotator cuff     Sneezing with watery eyes     Type 2 diabetes mellitus with complication, without long-term current use of insulin (H)       Current Outpatient Medications   Medication Sig Dispense Refill     blood glucose (ONETOUCH ULTRA) test strip Use to test blood sugars 4 times daily or as directed. 2 Box 3     blood glucose monitoring (ACCU-CHEK CINDY PLUS) meter device kit Use to test blood  sugars 4 times daily or as directed. 1 kit 0     insulin glargine (BASAGLAR KWIKPEN) 100 UNIT/ML pen Inject 10 Units Subcutaneous At Bedtime Or as directed 9 mL 3     insulin pen needle (32G X 6 MM) 32G X 6 MM miscellaneous Use pen needles daily or as directed with insulin. 100 each 3     loratadine (CLARITIN) 10 MG tablet Take 1 tablet (10 mg) by mouth daily 90 tablet 1     metFORMIN (GLUCOPHAGE) 1000 MG tablet Take 1 tablet (1,000 mg) by mouth 2 times daily (with meals) Needs appointment for further refills. 180 tablet 3     Blood Glucose Monitoring Suppl (ACCU-CHEK COMPLETE) KIT 1 Units daily (Patient not taking: Reported on 5/24/2018) 1 Units 0     Cholecalciferol (VITAMIN D) 2000 units tablet Take 2,000 Units by mouth daily (Patient not taking: Reported on 6/18/2019) 100 tablet 3     polyethylene glycol (MIRALAX) powder Take 17 g (1 capful) by mouth daily as needed for constipation (Patient not taking: Reported on 5/24/2018) 510 g 1     pravastatin (PRAVACHOL) 40 MG tablet Take 1 tablet (40 mg) by mouth daily (Patient not taking: Reported on 6/18/2019) 90 tablet 3     sertraline (ZOLOFT) 50 MG tablet Take 1 tablet (50 mg) by mouth daily (Patient not taking: Reported on 6/18/2019) 90 tablet 0          Allergies   Allergen Reactions     Apple Itching       Results from last visit:  Office Visit on 01/16/2019   Component Date Value Ref Range Status     Hemoglobin A1C 01/16/2019 10.8* 4.1 - 5.7 % Final     Cholesterol 01/16/2019 179.6  0.0 - 200.0 mg/dL Final     Cholesterol/HDL Ratio 01/16/2019 4.6  0.0 - 5.0 Final     HDL Cholesterol 01/16/2019 39.3* >40.0 mg/dL Final     LDL Cholesterol Calculated 01/16/2019 89  0 - 129 mg/dL Final     Triglycerides 01/16/2019 258.3* 0.0 - 150.0 mg/dL Final     VLDL Cholesterol 01/16/2019 51.7* 7.0 - 32.0 mg/dL Final     Vitamin D,25-Hydroxy 01/16/2019 28.5* 30.0 - 80.0 ng/mL Final     Results for orders placed or performed in visit on 06/18/19   Hemoglobin A1c ([facility])   Result  Value Ref Range    Hemoglobin A1C 9.4 (H) 4.1 - 5.7 %            Review of Systems:   CONSTITUTIONAL: no fatigue, no unexpected change in weight  EYES: no acute vision problems or changes  RESP: no significant cough, no shortness of breath  CV: no chest pain, no palpitations, no new or worsening peripheral edema            Physical Exam:     Vitals:    06/18/19 1439   BP: 114/70   BP Location: Right arm   Patient Position: Sitting   Cuff Size: Adult Regular   Pulse: 70   Resp: 18   Temp: 98.1  F (36.7  C)   TempSrc: Oral   SpO2: 97%   Weight: 74.3 kg (163 lb 12.8 oz)     Body mass index is 22.22 kg/m .  Vitals were reviewed and were normal  GENERAL: healthy, alert, well nourished, well hydrated, no distress  NECK: no tenderness, no adenopathy, no asymmetry, no masses, no stiffness; thyroid- normal to palpation  RESP: lungs clear to auscultation - no rales, no rhonchi, no wheezes  CV: regular rates and rhythm, normal S1 S2, no S3 or S4 and no murmur, no click or rub -  ABDOMEN: soft, no tenderness, no  hepatosplenomegaly, no masses, normal bowel sounds  MS: extremities- no gross deformities noted, no edema    Office Visit on 01/16/2019   Component Date Value Ref Range Status     Hemoglobin A1C 01/16/2019 10.8* 4.1 - 5.7 % Final     Cholesterol 01/16/2019 179.6  0.0 - 200.0 mg/dL Final     Cholesterol/HDL Ratio 01/16/2019 4.6  0.0 - 5.0 Final     HDL Cholesterol 01/16/2019 39.3* >40.0 mg/dL Final     LDL Cholesterol Calculated 01/16/2019 89  0 - 129 mg/dL Final     Triglycerides 01/16/2019 258.3* 0.0 - 150.0 mg/dL Final     VLDL Cholesterol 01/16/2019 51.7* 7.0 - 32.0 mg/dL Final     Vitamin D,25-Hydroxy 01/16/2019 28.5* 30.0 - 80.0 ng/mL Final         Assessment and Plan     Koko was seen today for diabetes, refill request, pain and medication reconciliation.    Diagnoses and all orders for this visit:    Type 2 diabetes mellitus with complication, without long-term current use of insulin (H) - great to see it is  better but aware that not at goal.  His lifestyle changes seem to help.  long discussion about his options. Reviewed that he could add rapid acting with meals or that he could switch to 70/30 or add GLP- 1. Recommended the latter and he will return in 2 weeks (after 7/1) to meet with the Pharm D team to get teaching and review.  At that point, the new formulary will be in for [facility].  Strongly recommended that he find a PCP and that he come in more regularly. He realizes that he needs to improve this.   -     Basic Metabolic Panel ([place])  -     Microalbumin Creatinine Ratio Random Ur ([facility])  -     Hemoglobin A1c ([facility])   -     [facility]    Encounter for preventive care  -     HIV Ag/Ab Screen [place] ([facility])    Need for vaccination  -     ADMIN VACCINE, INITIAL  -     TDAP VACCINE (BOOSTRIX)    Chronic idiopathic constipation - reviewed that he needs to drink more liquids. Did not like the Miralax. Has been likely taking colace and finds that this helps. Instructed to drink 2 L daily if possible and to take colace regularly.   -     docusate sodium (COLACE) 100 MG capsule; Take 1 capsule (100 mg) by mouth 2 times daily    Anxiety - reviewed that cannot take the serotonin specific reuptake inhibitor intermittently. Sounds like Zoloft leads to side effects. He wants to continue to explore meds so will try Lexapro. Aware that he needs to continue this on a daily basis for effect.   -     escitalopram (LEXAPRO) 10 MG tablet; Take 1 tablet (10 mg) by mouth daily    Type 2 diabetes mellitus with hyperglycemia, without long-term current use of insulin (H)- meds refilled  -     metFORMIN (GLUCOPHAGE) 1000 MG tablet; Take 1 tablet (1,000 mg) by mouth 2 times daily (with meals) Needs appointment for further refills.  -     pravastatin (PRAVACHOL) 40 MG tablet; Take 1 tablet (40 mg) by mouth daily  -     insulin glargine (BASAGLAR KWIKPEN) 100 UNIT/ML pen; Inject 15 Units Subcutaneous At  Bedtime Or as directed  -     insulin pen needle (32G X 6 MM) 32G X 6 MM miscellaneous; Use pen needles daily or as directed with insulin.    -     loratadine (CLARITIN) 10 MG tablet; Take 1 tablet (10 mg) by mouth daily      Vitamin D deficiency - refill   -     vitamin D3 (CHOLECALCIFEROL) 2000 units (50 mcg) tablet; Take 1 tablet (2,000 Units) by mouth daily      Total time: 30 minutes with more than half spent counseling on DM, anxiety and constipation    NOTE: called Koko when his BG came back at 500 (he had left the clinic by then).  No response - so left a message that he needed to drink a lot of liquids, repeat his BG and if above 400 to go to the ED to get insulin acutely.      6/19/2019: able to connect with him and he has had sugars in the 200 - 400 range since yesterday. Instructed him to increase to 20 Units of Lantus and to make an appointment today or at latest tomorrow with the Pharm Ds to start rapid acting insulin so that he can manage sugars acutely.  May still benefit from GLP1 but for now recommend insulin.       Medications Discontinued During This Encounter   Medication Reason     polyethylene glycol (MIRALAX) powder      sertraline (ZOLOFT) 50 MG tablet      metFORMIN (GLUCOPHAGE) 1000 MG tablet      loratadine (CLARITIN) 10 MG tablet      insulin glargine (BASAGLAR KWIKPEN) 100 UNIT/ML pen      Cholecalciferol (VITAMIN D) 2000 units tablet      pravastatin (PRAVACHOL) 40 MG tablet      insulin pen needle (32G X 6 MM) 32G X 6 MM miscellaneous      Blood Glucose Monitoring Suppl (ACCU-CHEK COMPLETE) KIT        Options for treatment and follow-up care were reviewed with the patient . Koko Milton  engaged in the decision making process and verbalized understanding of the options discussed and agreed with the final plan.    Nerissa Gallardo MD

## 2019-06-19 RX ORDER — INSULIN GLARGINE 100 [IU]/ML
20 INJECTION, SOLUTION SUBCUTANEOUS AT BEDTIME
Qty: 9 ML | Refills: 3 | Status: SHIPPED | OUTPATIENT
Start: 2019-06-19 | End: 2020-02-13

## 2019-06-25 ENCOUNTER — OFFICE VISIT (OUTPATIENT)
Dept: PHARMACY | Facility: CLINIC | Age: 44
End: 2019-06-25
Payer: COMMERCIAL

## 2019-06-25 VITALS
OXYGEN SATURATION: 96 % | DIASTOLIC BLOOD PRESSURE: 78 MMHG | SYSTOLIC BLOOD PRESSURE: 125 MMHG | HEART RATE: 63 BPM | BODY MASS INDEX: 22.38 KG/M2 | WEIGHT: 165 LBS | RESPIRATION RATE: 12 BRPM | TEMPERATURE: 97.7 F

## 2019-06-25 DIAGNOSIS — F41.9 ANXIETY: Primary | ICD-10-CM

## 2019-06-25 DIAGNOSIS — E11.8 TYPE 2 DIABETES MELLITUS WITH COMPLICATION, WITHOUT LONG-TERM CURRENT USE OF INSULIN (H): ICD-10-CM

## 2019-06-25 DIAGNOSIS — E11.8 TYPE 2 DIABETES MELLITUS WITH COMPLICATION, WITHOUT LONG-TERM CURRENT USE OF INSULIN (H): Primary | ICD-10-CM

## 2019-06-25 DIAGNOSIS — Z53.9 ERRONEOUS ENCOUNTER--DISREGARD: ICD-10-CM

## 2019-06-25 NOTE — PROGRESS NOTES
Medication Management Note                                                       Koko was referred by Dr. Gallardo for pharmacy services for diabetes followup    MEDICATION REVIEW:  Discussed all medication indications, dosage and effectiveness, adverse effects, and adherence with patient/caregiver.    Pt had meds with them: no  Pt had med list with them: no  Pt was knowledgeable about meds: yes  Medications set up by: self  Medications administered by someone else (e.g., LTCF): No  Pt uses a medication box or automated dispenser: no    Medication Discrepancies  Medications (including OTCs and supplements) on EMR med list that pt is NOT taking:  yes, escitalopram  Medications pt IS taking that are NOT on EMR med list (e.g., from specialist, hospital): fish oil  Dosage or frequency listed differently than how patient is taking: none  Duplicate medication on list (two occurrences of the same medication):  none  TOTAL NUMBER OF MEDICATION DISCREPANCIES:  2    Subjective                                                       Patient reports the following problems or concerns with their medications:  none  Patient reports the following adverse reactions to medications:  none  Pt reports missing doses:  never  Additional subjective information (e.g., reason for visit, frequency of PRNs, reasons meds were D/C ed):    Over the last week, after increasing Lantus to 20 units daily, his blood sugars have been (from his memory; he didn't have his meter with him):    Wed 6/19: >400    Thu 6/20: >400    Fri 6/21: >400    Sat 6/22: 323    Sun 6/23: 281    Mon 6/24: 117    Tue 6/25: 119    During Ramadan (5/5-6/10, he didn't work out and he ate more sweets every day when fasting ended at night.  Now he is eating better, and has been working out for the past week every day; going to the gym for 2-3 hours per day    He eats a lot of fruit.    Thin; BMI 21.99.  BMI has been dropping from 25 in 2017 until now.    He picked up the Lexapro  and has taken it a couple times PRN when he felt down.    Objective                                                       Patient Active Problem List   Diagnosis     Dyslipidemia     ED (erectile dysfunction)     Tendinopathy of right rotator cuff     Sneezing with watery eyes     Type 2 diabetes mellitus with complication, without long-term current use of insulin (H)       Current Outpatient Medications   Medication Sig Dispense Refill     blood glucose (ONETOUCH ULTRA) test strip Use to test blood sugars 4 times daily or as directed. 2 Box 3     blood glucose monitoring (ACCU-CHEK CINDY PLUS) meter device kit Use to test blood sugars 4 times daily or as directed. 1 kit 0     docusate sodium (COLACE) 100 MG capsule Take 1 capsule (100 mg) by mouth 2 times daily 180 capsule 3     escitalopram (LEXAPRO) 10 MG tablet Take 1 tablet (10 mg) by mouth daily 30 tablet 1     insulin glargine (BASAGLAR KWIKPEN) 100 UNIT/ML pen Inject 20 Units Subcutaneous At Bedtime Or as directed 9 mL 3     insulin pen needle (32G X 6 MM) 32G X 6 MM miscellaneous Use pen needles daily or as directed with insulin. 100 each 3     loratadine (CLARITIN) 10 MG tablet Take 1 tablet (10 mg) by mouth daily 90 tablet 3     metFORMIN (GLUCOPHAGE) 1000 MG tablet Take 1 tablet (1,000 mg) by mouth 2 times daily (with meals) Needs appointment for further refills. 180 tablet 3     pravastatin (PRAVACHOL) 40 MG tablet Take 1 tablet (40 mg) by mouth daily 90 tablet 3     vitamin D3 (CHOLECALCIFEROL) 2000 units (50 mcg) tablet Take 1 tablet (2,000 Units) by mouth daily 100 tablet 3       Social History     Tobacco Use     Smoking status: Never Smoker     Smokeless tobacco: Never Used   Substance Use Topics     Alcohol use: No     Drug use: No       Estimated Creatinine Clearance: 166.2 mL/min (A) (based on SCr of 0.6 mg/dL (L)).    Lab Results   Component Value Date    A1C 9.4 06/18/2019    A1C 10.8 01/16/2019    A1C 11.0 05/24/2018    A1C 11.0 03/23/2018     A1C 9.1 04/11/2017     Last Comprehensive Metabolic Panel:  Sodium   Date Value Ref Range Status   06/18/2019 133.8 132.0 - 142.0 mmol/L Final     Potassium   Date Value Ref Range Status   06/18/2019 4.6 3.2 - 4.6 mmol/dL Final     Chloride   Date Value Ref Range Status   06/18/2019 97.3 (L) 98.0 - 110.0 mmol/L Final     Carbon Dioxide   Date Value Ref Range Status   06/18/2019 26.8 20.0 - 32.0 mmol/L Final     Anion Gap   Date Value Ref Range Status   04/28/2016 7 3 - 14 mmol/L Final     Glucose   Date Value Ref Range Status   06/18/2019 503.7 (HH) 70.0 - 99.0 mg'dL Final     Urea Nitrogen   Date Value Ref Range Status   06/18/2019 7.1 7.0 - 21.0 mg/dL Final     Creatinine   Date Value Ref Range Status   06/18/2019 0.6 (L) 0.7 - 1.3 mg/dL Final     GFR Estimate   Date Value Ref Range Status   06/18/2019 >90 >60.0 mL/min/1.7 m2 Final     Calcium   Date Value Ref Range Status   06/18/2019 9.2 8.5 - 10.1 mg/dL Final       BP Readings from Last 3 Encounters:   06/25/19 125/78   06/18/19 114/70   01/16/19 114/77       The 10-year ASCVD risk score (Imani LACEY Jr., et al., 2013) is: 7.3%    Values used to calculate the score:      Age: 44 years      Sex: Male      Is Non- : Yes      Diabetic: Yes      Tobacco smoker: No      Systolic Blood Pressure: 125 mmHg      Is BP treated: No      HDL Cholesterol: 39.3 mg/dL      Total Cholesterol: 179.6 mg/dL    PHQ-9 score:    PHQ-9 SCORE 5/11/2017   PHQ-9 Total Score 17       Assessment / Plan                                                       Updated medication list in the EMR; deleted meds patient no longer taking and added meds patient is now taking, and changed doses where there was a dose discrepancy.    All medications were reviewed and found to be indicated, effective, safe and convenient/ affordable unless drug therapy problem(s) was/were identified, as are described below.      Completed at this visit    Diabetes Type 2     Did not increase insulin  today as his BS have come down significantly    Check c-peptide as he is thin and has a low BMI    Discussed starting Bydureon and showed him video on how to use it    Not covered until July 1.    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  He agreed    Options for treatment and/or follow-up care were reviewed with the patient.  Koko was engaged and actively involved in the decision making process, verbalized understanding of the options discussed, and was satisfied with the final plan.    Patient was provided with written instructions/medication list via AVS.     Follow-up                                                       Medication issues to be addressed at a future visit      Prescribe/initiate Bydureon    Decrease Lantus depending on blood sugars    Dr. Rome was provided the recommendations above  via routed note and Dr. Salguero was available for supervision during this visit and is the authorizing prescriber for this visit through the pharmacist collaborative practice agreement.    Mary Santo, PharmD      Drug therapy problems identified  1. Med: insulin - Indication - need to check if needed (if insulin deficiency) - Resolution: Order lab or test; resolved  2. Med: lexapro - compliance  - not taking regularly - Resolution: Educate patient; resolved     # of medical conditions addressed: 2  # of medications addressed: 7  # of medication discrepancies identified: 2  # of DTP  identified: 2  Time spent: 40 minutes  Level of service: 3

## 2019-06-25 NOTE — PATIENT INSTRUCTIONS
Check fasting blood sugar every day and record in your phone  Check as many post-meal blood sugars as you can and record in your phone (and record how long after a meal they were- ideally 1-2 hours)  Make an appointment in 1 week with Dr. Santo.

## 2019-06-27 LAB — C PEPTIDE: 4 NG/ML (ref 0.9–6.9)

## 2019-08-19 DIAGNOSIS — F41.9 ANXIETY: ICD-10-CM

## 2019-08-19 RX ORDER — ESCITALOPRAM OXALATE 10 MG/1
10 TABLET ORAL DAILY
Qty: 30 TABLET | Refills: 1 | Status: SHIPPED | OUTPATIENT
Start: 2019-08-19 | End: 2020-02-13

## 2019-08-19 NOTE — TELEPHONE ENCOUNTER
"Request for medication refill: Escitalopram 10mg tabs    Providers if patient needs an appointment and you are willing to give a one month supply please refill for one month and  send a letter/MyChart using \".SMILLIMITEDREFILL\" .smillimited and route chart to \"P SMI \" (Giving one month refill in non controlled medications is strongly recommended before denial)    If refill has been denied, meaning absolutely no refills without visit, please complete the smart phrase \".smirxrefuse\" and route it to the \"P SMI MED REFILLS\"  pool to inform the patient and the pharmacy.    Kierra Mohr CMA        "

## 2019-10-23 ENCOUNTER — TELEPHONE (OUTPATIENT)
Dept: FAMILY MEDICINE | Facility: CLINIC | Age: 44
End: 2019-10-23

## 2019-10-23 NOTE — TELEPHONE ENCOUNTER
Prior Authorization Retail Medication Request    Medication/Dose: pravastatin (PRAVACHOL) 40 MG tablet  ICD code (if different than what is on RX):  Type 2 diabetes mellitus with hyperglycemia, without long-term current use of insulin (H) [E11.65]   Previously Tried and Failed:  See chart  Rationale:  See chart    Insurance Name:  SANIYA REMY  Insurance ID: [id number]        Pharmacy Information (if different than what is on RX)  Name:  [id number] - [place], [place] - [address] AT [facility] [address]  Phone:  [phone]

## 2019-10-24 NOTE — TELEPHONE ENCOUNTER
Central Prior Authorization Team   [phone]    PA Initiation    Medication: Pravastatin 40mg tabs  Insurance Company: [employer] - Phone [phone] Fax [phone]  Pharmacy Filling the Rx: [id number] - [place], [place] - [facility] [address] AT [facility] [address]  Filling Pharmacy Phone: [phone]  Filling Pharmacy Fax: [phone]  Start Date: 10/24/2019

## 2019-10-28 NOTE — TELEPHONE ENCOUNTER
Prior Authorization Not Needed per Insurance    Medication: Pravastatin 40mg tabs-PA NOT NEEDED   Insurance Company: [employer] - Phone [phone] Fax [phone]  Expected CoPay: NO CO-PAY    Pharmacy Filling the Rx: [id number] - [place], [place] - [address] AT [facility] OF [address]  Pharmacy Notified: Yes  Patient Notified: No    Called pharmacy and pharmacy stated that PA is Not Needed and medication is covered. Pharmacy stated that they have a paid claim on medication on 10/18/2019 quantity 30 for 30 day supply and patient has picked up medication already. Insurance also states that PA is Not Needed and medication is covered.

## 2020-02-13 ENCOUNTER — OFFICE VISIT (OUTPATIENT)
Dept: FAMILY MEDICINE | Facility: CLINIC | Age: 45
End: 2020-02-13
Payer: COMMERCIAL

## 2020-02-13 VITALS
HEART RATE: 73 BPM | TEMPERATURE: 97.7 F | RESPIRATION RATE: 18 BRPM | BODY MASS INDEX: 21.97 KG/M2 | SYSTOLIC BLOOD PRESSURE: 110 MMHG | OXYGEN SATURATION: 95 % | WEIGHT: 162 LBS | DIASTOLIC BLOOD PRESSURE: 71 MMHG

## 2020-02-13 DIAGNOSIS — E78.5 DYSLIPIDEMIA: ICD-10-CM

## 2020-02-13 DIAGNOSIS — E55.9 VITAMIN D DEFICIENCY: ICD-10-CM

## 2020-02-13 DIAGNOSIS — E11.9 TYPE 2 DIABETES MELLITUS WITHOUT COMPLICATION, WITHOUT LONG-TERM CURRENT USE OF INSULIN (H): ICD-10-CM

## 2020-02-13 DIAGNOSIS — E11.00 TYPE 2 DIABETES MELLITUS WITH HYPEROSMOLARITY WITHOUT COMA, WITHOUT LONG-TERM CURRENT USE OF INSULIN (H): ICD-10-CM

## 2020-02-13 DIAGNOSIS — E11.65 TYPE 2 DIABETES MELLITUS WITH HYPERGLYCEMIA, WITHOUT LONG-TERM CURRENT USE OF INSULIN (H): Primary | ICD-10-CM

## 2020-02-13 LAB
ANION GAP SERPL CALCULATED.3IONS-SCNC: 10 MMOL/L (ref 5–18)
BILIRUBIN UR: NEGATIVE MG/DL
BLOOD UR: NEGATIVE MG/DL
BUN SERPL-MCNC: 8 MG/DL (ref 8–22)
CALCIUM SERPL-MCNC: 9.8 MG/DL (ref 8.5–10.5)
CHLORIDE SERPL-SCNC: 99 MMOL/L (ref 98–107)
CHOLEST SERPL-MCNC: 181.4 MG/DL (ref 0–200)
CHOLEST/HDLC SERPL: 4.4 {RATIO} (ref 0–5)
CO2 SERPL-SCNC: 28 MMOL/L (ref 22–31)
CREAT SERPL-MCNC: 0.88 MG/DL (ref 0.7–1.3)
CREAT UR-MCNC: 65.2 MG/DL
GLUCOSE SERPL-MCNC: 381 MG/DL (ref 70–125)
GLUCOSE URINE: ABNORMAL
HBA1C MFR BLD: 10.6 % (ref 4.1–5.7)
HDLC SERPL-MCNC: 41.5 MG/DL
KETONES UR QL: NEGATIVE MG/DL
LDLC SERPL CALC-MCNC: 67 MG/DL (ref 0–129)
LEUKOCYTE ESTERASE UR: NEGATIVE
MICROALBUMIN UR-MCNC: 0.66 MG/DL (ref 0–1.99)
MICROALBUMIN/CREAT UR: 10.1 MG/G
NITRITE UR QL STRIP: NEGATIVE MG/DL
PH UR STRIP: 5.5 [PH] (ref 4.5–8)
POTASSIUM SERPL-SCNC: 4.4 MMOL/L (ref 3.5–5)
PROTEIN UR: NEGATIVE MG/DL
SODIUM SERPL-SCNC: 137 MMOL/L (ref 136–145)
SP GR UR STRIP: 1.01 (ref 1–1.03)
TRIGL SERPL-MCNC: 362.1 MG/DL (ref 0–150)
UROBILINOGEN UR STRIP-ACNC: ABNORMAL E.U./DL
VLDL CHOLESTEROL: 72.4 MG/DL (ref 7–32)

## 2020-02-13 RX ORDER — BLOOD-GLUCOSE METER
EACH MISCELLANEOUS
Qty: 1 KIT | Refills: 0 | Status: SHIPPED | OUTPATIENT
Start: 2020-02-13 | End: 2021-03-01

## 2020-02-13 RX ORDER — PRAVASTATIN SODIUM 40 MG
40 TABLET ORAL DAILY
Qty: 90 TABLET | Refills: 3 | Status: SHIPPED | OUTPATIENT
Start: 2020-02-13 | End: 2021-02-18

## 2020-02-13 RX ORDER — CHOLECALCIFEROL (VITAMIN D3) 50 MCG
2000 TABLET ORAL DAILY
Qty: 100 TABLET | Refills: 3 | Status: SHIPPED | OUTPATIENT
Start: 2020-02-13 | End: 2021-02-18

## 2020-02-13 NOTE — PROGRESS NOTES
Preceptor Attestation:   Patient seen, evaluated and discussed with the resident. I have verified the content of the note, which accurately reflects my assessment of the patient and the plan of care.   Supervising Physician:  Giancarlo Lizama MD.

## 2020-02-13 NOTE — PROGRESS NOTES
SUBJECTIVE       Koko Milton is a 45 year old  male with a PMH significant for:     Patient Active Problem List   Diagnosis     Dyslipidemia     ED (erectile dysfunction)     Tendinopathy of right rotator cuff     Sneezing with watery eyes     Type 2 diabetes mellitus with complication, without long-term current use of insulin (H)       He presents for diabetes follow-up.    The patient was diagnosed with type 2 diabetes over 5 years ago.  He was followed with our clinic semi-regularly, most recent visit was on 6/18/2019.  At that time he was prescribed metformin and 20 units of long-acting insulin at night.  There have been some discussion on starting him on GLP-1 agonist as well.  Patient has not checked his blood sugars at all for the last 2 years.    He states that since his most recent visit, he has been using his long-acting insulin at night, between 10 and 20 units.  During the week, he exercises and on those nights, he will take 10 units of long-acting insulin.  On the weekends, he does not exercise he will generally do 15 units of long-acting insulin.  If he eats a lot of sugary food or beverages during the day, he will take 20 units of Basaglar instead.  He is unsure how this affects his blood sugars as he does not check them.  He has continued taking metformin, although he has been out for about 2 weeks.  He reports that he has seen an eye doctor within the last 3 months, although records are not available for us today.    The patient reports that he has been feeling well with his regimen, and simply has not had the time to follow-up with the doctor.  He denies vision changes, new paresthesias.  No abdominal pain or other side effects.  He states that the only reason he has not been  checking his blood sugars is that his insurance company did not cover the most recent glucometer that was prescribed.      PMH, Medications and Allergies were reviewed and updated as needed.        REVIEW OF SYSTEMS      CONSTITUTIONAL: NEGATIVE for fever, chills  EYES: NEGATIVE for vision changes or irritation  RESP: NEGATIVE for significant cough or SOB  CV: NEGATIVE for chest pain, palpitations or peripheral edema  GI: NEGATIVE for nausea, abdominal pain, heartburn, or change in bowel habits  ENDOCRINE: NEGATIVE for temperature intolerance, skin/hair changes  PSYCHIATRIC: NEGATIVE for changes in mood or affect        OBJECTIVE     Vitals:    02/13/20 1032   BP: 110/71   BP Location: Right arm   Patient Position: Sitting   Pulse: 73   Resp: 18   Temp: 97.7  F (36.5  C)   TempSrc: Oral   SpO2: 95%   Weight: 73.5 kg (162 lb)     Body mass index is 21.97 kg/m .    Constitutional: Awake, alert, cooperative, no apparent distress, and appears stated age.  Eyes: extra ocular muscles intact, sclera clear, conjunctiva normal.  Lungs: No increased work of breathing, good air exchange, clear to auscultation bilaterally, no crackles or wheezing.  Cardiovascular: Regular rate and rhythm, normal S1 and S2  Neuropsychiatric: Normal affect, mood, orientation, memory and insight.  Skin: No rashes, erythema, pallor, petechia or purpura.    ASSESSMENT AND PLAN     1. Type 2 diabetes mellitus with hyperglycemia, without long-term current use of insulin (H)  Patient has not followed up for his diabetes since June 2019.  He is currently prescribed he has been taking between 10 and 20 units of  long-acting insulin, 20 units at night, and metformin.  Nightly insulin, based on his daily activities.  He has not been checking his blood sugars at all.  He has been going to the gym 5 days a week and has lost about 10 pounds over the last year.  We will recheck all diabetes labs today, including A1c, BMP, lipid panel, UA, urine microalbumin.  Patient will be taking 15 units of Lantus nightly, will also be taking metformin.  He will check his blood sugars every morning before breakfast and either before lunch or dinner every day as well.  He will record  these numbers and bring them in to clinic in 2 weeks to review, at that time we will adjust his insulin.  Previously had been discussing starting a GLP-1 agonist, we will revisit this in 2 weeks as well.  - pravastatin (PRAVACHOL) 40 MG tablet; Take 1 tablet (40 mg) by mouth daily  Dispense: 90 tablet; Refill: 3  - metFORMIN (GLUCOPHAGE) 1000 MG tablet; Take 1 tablet (1,000 mg) by mouth 2 times daily (with meals) Needs appointment for further refills.  Dispense: 180 tablet; Refill: 3  - insulin glargine (LANTUS PEN) 100 UNIT/ML pen; Inject 15 Units Subcutaneous At Bedtime  Dispense: 3 mL; Refill: 11  - Hemoglobin A1c ([facility])  - Basic Metabolic Profile ([facility])  - Microalbumin Random Ur ([facility])  - Urinalysis([facility])  - insulin pen needle (32G X 6 MM) 32G X 6 MM miscellaneous; Use pen needles daily or as directed with insulin.  Dispense: 100 each; Refill: 3  - blood glucose monitoring (ACCU-CHEK CINDY PLUS) meter device kit; Use to test blood sugars 4 times daily or as directed.  Dispense: 1 kit; Refill: 0  - blood glucose (ONETOUCH ULTRA) test strip; Use to test blood sugars 4 times daily or as directed.  Dispense: 2 Box; Refill: 3    2. Vitamin D deficiency  Refill  - vitamin D3 (CHOLECALCIFEROL) 2000 units (50 mcg) tablet; Take 1 tablet (2,000 Units) by mouth daily  Dispense: 100 tablet; Refill: 3    3. Dyslipidemia  Currently taking pravastatin 20 mg.  We will recheck a lipid panel today.  - Lipid Panel ([facility])        RTC in 2 weeks for follow up of DM2 or sooner if develops new or worsening symptoms.    Patient staffed with Dr. Dl Luevano MD

## 2021-02-17 DIAGNOSIS — E11.65 TYPE 2 DIABETES MELLITUS WITH HYPERGLYCEMIA, WITHOUT LONG-TERM CURRENT USE OF INSULIN (H): ICD-10-CM

## 2021-02-17 RX ORDER — PRAVASTATIN SODIUM 40 MG
40 TABLET ORAL DAILY
Qty: 90 TABLET | Refills: 3 | OUTPATIENT
Start: 2021-02-17

## 2021-02-18 ENCOUNTER — OFFICE VISIT (OUTPATIENT)
Dept: FAMILY MEDICINE | Facility: CLINIC | Age: 46
End: 2021-02-18
Payer: COMMERCIAL

## 2021-02-18 VITALS
BODY MASS INDEX: 20.48 KG/M2 | RESPIRATION RATE: 20 BRPM | DIASTOLIC BLOOD PRESSURE: 79 MMHG | SYSTOLIC BLOOD PRESSURE: 117 MMHG | WEIGHT: 151 LBS | TEMPERATURE: 98.2 F | OXYGEN SATURATION: 95 % | HEART RATE: 84 BPM

## 2021-02-18 DIAGNOSIS — E11.9 TYPE 2 DIABETES MELLITUS WITHOUT COMPLICATION, WITH LONG-TERM CURRENT USE OF INSULIN (H): Primary | ICD-10-CM

## 2021-02-18 DIAGNOSIS — Z79.4 TYPE 2 DIABETES MELLITUS WITHOUT COMPLICATION, WITH LONG-TERM CURRENT USE OF INSULIN (H): Primary | ICD-10-CM

## 2021-02-18 DIAGNOSIS — R10.9 FLANK PAIN: ICD-10-CM

## 2021-02-18 DIAGNOSIS — E55.9 VITAMIN D DEFICIENCY: ICD-10-CM

## 2021-02-18 DIAGNOSIS — E11.65 TYPE 2 DIABETES MELLITUS WITH HYPERGLYCEMIA, WITHOUT LONG-TERM CURRENT USE OF INSULIN (H): ICD-10-CM

## 2021-02-18 DIAGNOSIS — E78.5 DYSLIPIDEMIA: ICD-10-CM

## 2021-02-18 DIAGNOSIS — E11.9 TYPE 2 DIABETES MELLITUS WITHOUT COMPLICATION, WITHOUT LONG-TERM CURRENT USE OF INSULIN (H): ICD-10-CM

## 2021-02-18 LAB
BILIRUBIN UR: NEGATIVE MG/DL
BLOOD UR: NEGATIVE MG/DL
BUN SERPL-MCNC: 13 MG/DL (ref 7–21)
CALCIUM SERPL-MCNC: 9.9 MG/DL (ref 8.5–10.1)
CHLORIDE SERPLBLD-SCNC: 94.9 MMOL/L (ref 98–110)
CHOLEST SERPL-MCNC: 245.2 MG/DL (ref 0–200)
CHOLEST/HDLC SERPL: 7.4 {RATIO} (ref 0–5)
CO2 SERPL-SCNC: 25.8 MMOL/L (ref 20–32)
CREAT SERPL-MCNC: 0.7 MG/DL (ref 0.7–1.3)
CREAT UR-MCNC: 58.2 MG/DL
GFR SERPL CREATININE-BSD FRML MDRD: >90 ML/MIN/1.7 M2
GLUCOSE SERPL-MCNC: 452.4 MG'DL (ref 70–99)
GLUCOSE URINE: ABNORMAL
HBA1C MFR BLD: 13.2 % (ref 4.1–5.7)
HDLC SERPL-MCNC: 33 MG/DL
KETONES UR QL: ABNORMAL MG/DL
LDLC SERPL CALC-MCNC: ABNORMAL MG/DL (ref 0–129)
LDLC SERPL DIRECT ASSAY-MCNC: 123 MG/DL
LEUKOCYTE ESTERASE UR: NEGATIVE
MICROALBUMIN UR-MCNC: 0.61 MG/DL (ref 0–1.99)
MICROALBUMIN/CREAT UR: 10.5 MG/G
NITRITE UR QL STRIP: NEGATIVE MG/DL
PH UR STRIP: 6 [PH] (ref 4.5–8)
POTASSIUM SERPL-SCNC: 4.2 MMOL/L (ref 3.2–4.6)
PROTEIN UR: NEGATIVE MG/DL
SODIUM SERPL-SCNC: 131 MMOL/L (ref 132–142)
SP GR UR STRIP: 1.01 (ref 1–1.03)
TRIGL SERPL-MCNC: 618.1 MG/DL (ref 0–150)
UROBILINOGEN UR STRIP-ACNC: ABNORMAL E.U./DL
VLDL CHOLESTEROL: 123.6 MG/DL (ref 7–32)

## 2021-02-18 PROCEDURE — 83036 HEMOGLOBIN GLYCOSYLATED A1C: CPT | Performed by: STUDENT IN AN ORGANIZED HEALTH CARE EDUCATION/TRAINING PROGRAM

## 2021-02-18 PROCEDURE — 81003 URINALYSIS AUTO W/O SCOPE: CPT | Performed by: STUDENT IN AN ORGANIZED HEALTH CARE EDUCATION/TRAINING PROGRAM

## 2021-02-18 PROCEDURE — 80061 LIPID PANEL: CPT | Performed by: STUDENT IN AN ORGANIZED HEALTH CARE EDUCATION/TRAINING PROGRAM

## 2021-02-18 PROCEDURE — 99214 OFFICE O/P EST MOD 30 MIN: CPT | Mod: GC | Performed by: STUDENT IN AN ORGANIZED HEALTH CARE EDUCATION/TRAINING PROGRAM

## 2021-02-18 PROCEDURE — 36415 COLL VENOUS BLD VENIPUNCTURE: CPT | Performed by: STUDENT IN AN ORGANIZED HEALTH CARE EDUCATION/TRAINING PROGRAM

## 2021-02-18 PROCEDURE — 80048 BASIC METABOLIC PNL TOTAL CA: CPT | Performed by: STUDENT IN AN ORGANIZED HEALTH CARE EDUCATION/TRAINING PROGRAM

## 2021-02-18 RX ORDER — PRAVASTATIN SODIUM 40 MG
40 TABLET ORAL DAILY
Qty: 90 TABLET | Refills: 3 | Status: SHIPPED | OUTPATIENT
Start: 2021-02-18 | End: 2022-04-04

## 2021-02-18 RX ORDER — CHOLECALCIFEROL (VITAMIN D3) 50 MCG
50 TABLET ORAL DAILY
Qty: 100 TABLET | Refills: 3 | Status: SHIPPED | OUTPATIENT
Start: 2021-02-18 | End: 2022-04-04

## 2021-02-18 RX ORDER — LIRAGLUTIDE 6 MG/ML
0.6 INJECTION SUBCUTANEOUS DAILY
Qty: 6 ML | Refills: 3 | Status: SHIPPED | OUTPATIENT
Start: 2021-02-18 | End: 2021-07-01

## 2021-02-18 RX ORDER — BLOOD PRESSURE TEST KIT
1 KIT MISCELLANEOUS DAILY
Qty: 100 EACH | Refills: 4 | Status: SHIPPED | OUTPATIENT
Start: 2021-02-18 | End: 2022-07-08

## 2021-02-18 NOTE — PROGRESS NOTES
[facility] Visit    Assessment/Plan:  Koko was seen today for clinic care coordination - follow-up and abdominal pain.    Diagnoses and all orders for this visit:    Type 2 diabetes mellitus without complication, with long-term current use of insulin (H)  Patient with A1C now elevated > 13. He would prefer not to start insulin again due to perceived SE. HE is willing to start Victoza. He will also start taking metformin 1000 mg BID as prescribed. Commended him for his excellent lifestyle changes and weight loss. Provided glucose log. He will come back in 2 weeks for DM follow up  -     Microalbumin Creatinine Ratio Random Ur ([facility])  -     Basic Metabolic Panel ([facility])  - Results < 1 hr  -     Lipid Panel ([facility]) - Results < 1 hr  -     Hemoglobin A1c ([facility])  -     LDL Cholesterol  Direct ([facility])  -     liraglutide (VICTOZA) 18 MG/3ML solution; Inject 0.6 mg Subcutaneous daily  -     insulin pen needle (32G X 6 MM) 32G X 6 MM miscellaneous; Use pen needles daily or as directed with insulin.  -     Alcohol Swabs PADS; 1 each daily With Victoza injection. Allow to dry before injecting  -     metFORMIN (GLUCOPHAGE) 1000 MG tablet; Take 1 tablet (1,000 mg) by mouth 2 times daily (with meals) Needs appointment for further refills.  -     blood glucose (NO BRAND SPECIFIED) lancets standard; Use to test blood sugar 4 times daily or as directed.    Dyslipidemia  -     Lipid Panel ([facility]) - Results < 1 hr  -     pravastatin (PRAVACHOL) 40 MG tablet; Take 1 tablet (40 mg) by mouth daily    Vitamin D deficiency  -     vitamin D3 (CHOLECALCIFEROL) 50 mcg (2000 units) tablet; Take 1 tablet (50 mcg) by mouth daily    Type 2 diabetes mellitus without complication, without long-term current use of insulin (H)  -     blood glucose (ONETOUCH ULTRA) test strip; Use to test blood sugars 4 times daily or as directed.      Options for treatment and follow-up care were reviewed with the patient who  was engaged and actively involved in the decision making process, verbalized understanding of the options discussed, and satisfied with the final plan.    Patient was staffed with supervising physician, Dr. Dangelo Quinn.     Scott Rome MD PGY3  [facility] Medicine    Subjective:  Koko Milton is a 46 year old male with a PMHx significant for   Patient Active Problem List   Diagnosis     Dyslipidemia     ED (erectile dysfunction)     Tendinopathy of right rotator cuff     Sneezing with watery eyes     Type 2 diabetes mellitus without complication, with long-term current use of insulin (H)    who presents for DM Visit; has not been seen for 1 year. Notes that he stopped insulin in October; was having site itching and tingling in the legs he attributed to insulin. Is supposed to be on Metformin 1000 BID; but he reports he does not always take the full 2000 mg daily. He has modified his diet and does exercise. He actually has lost weight over the last year.     ROS:   A complete 12-point ROS was obtained and was negative except as stated above in HPI.    Objective:  Vitals:    02/18/21 1540   BP: 117/79   BP Location: Right arm   Patient Position: Sitting   Cuff Size: Adult Regular   Pulse: 84   Resp: 20   Temp: 98.2  F (36.8  C)   TempSrc: Oral   SpO2: 95%   Weight: 68.5 kg (151 lb)     Body mass index is 20.48 kg/m .    GEN: NAD, healthy, alert  EYES: grossly normal to inspection, normal conjunctivae/sclerae  RESP: CTAB, no w/r/r  CV: RRR, nl S1/S2, no S3/S4, no m/r/g, no peripheral edema, peripheral pulses strong  MSK: no MSK defects noted, gait is age appropriate w/o ataxia  NEURO: nmentation intact, speech normal  PSYCH: mentation appears normal, affect normal/bright    No results found for this or any previous visit (from the past 24 hour(s)).

## 2021-02-18 NOTE — TELEPHONE ENCOUNTER
Contacted the pt and have him scheduled for 3:30 this afternoon. Pt was complaining of flank pain and asked if Dr. Rome would be willing to have a kidney functions test ordered in addition to any other labs that may be ordered.    Cristo Melgar on 2/18/2021 at 8:47 AM

## 2021-02-18 NOTE — PROGRESS NOTES
Preceptor Attestation:    Patient seen and evaluated in person. I discussed the patient with the resident. I have verified the content of the note, which accurately reflects my assessment of the patient and the plan of care.   Supervising Physician:  Dangelo Quinn MD.

## 2021-02-19 PROBLEM — E11.9 TYPE 2 DIABETES MELLITUS WITHOUT COMPLICATION, WITH LONG-TERM CURRENT USE OF INSULIN (H): Status: ACTIVE | Noted: 2017-09-12

## 2021-02-19 PROBLEM — Z79.4 TYPE 2 DIABETES MELLITUS WITHOUT COMPLICATION, WITH LONG-TERM CURRENT USE OF INSULIN (H): Status: ACTIVE | Noted: 2017-09-12

## 2021-03-01 DIAGNOSIS — E11.00 TYPE 2 DIABETES MELLITUS WITH HYPEROSMOLARITY WITHOUT COMA, WITHOUT LONG-TERM CURRENT USE OF INSULIN (H): ICD-10-CM

## 2021-03-01 RX ORDER — BLOOD-GLUCOSE METER
EACH MISCELLANEOUS
Qty: 1 KIT | Refills: 0 | Status: SHIPPED | OUTPATIENT
Start: 2021-03-01

## 2021-05-14 DIAGNOSIS — J30.2 ACUTE SEASONAL ALLERGIC RHINITIS: ICD-10-CM

## 2021-05-14 RX ORDER — LORATADINE 10 MG/1
10 TABLET ORAL DAILY
Qty: 30 TABLET | Refills: 0 | Status: SHIPPED | OUTPATIENT
Start: 2021-05-14 | End: 2021-07-01 | Stop reason: ALTCHOICE

## 2021-05-14 NOTE — TELEPHONE ENCOUNTER
"Request for medication refill:  loratadine (CLARITIN) 10 MG tablet  Providers if patient needs an appointment and you are willing to give a one month supply please refill for one month and  send a letter/MyChart using \".SMILLIMITEDREFILL\" .smillimited and route chart to \"[facility] \" (Giving one month refill in non controlled medications is strongly recommended before denial)    If refill has been denied, meaning absolutely no refills without visit, please complete the smart phrase \".smirxrefuse\" and route it to the \"[facility] MED REFILLS\"  pool to inform the patient and the pharmacy.    Susan Robertson        "

## 2021-06-18 NOTE — PROGRESS NOTES
Patient failed to show for today's scheduled appointment - medications were entered manually to EPIC - referral and last PCP notes sent to be scanned - will have our scheduling dept call to attempt to reschedule  Most current A1c 11.0 % from 5/24/18

## 2021-06-25 ENCOUNTER — OFFICE VISIT (OUTPATIENT)
Dept: FAMILY MEDICINE | Facility: CLINIC | Age: 46
End: 2021-06-25
Payer: COMMERCIAL

## 2021-06-25 VITALS
OXYGEN SATURATION: 98 % | DIASTOLIC BLOOD PRESSURE: 78 MMHG | WEIGHT: 149 LBS | BODY MASS INDEX: 20.21 KG/M2 | SYSTOLIC BLOOD PRESSURE: 122 MMHG | TEMPERATURE: 97.7 F | HEART RATE: 72 BPM | RESPIRATION RATE: 16 BRPM

## 2021-06-25 DIAGNOSIS — K64.8 INTERNAL HEMORRHOIDS: ICD-10-CM

## 2021-06-25 DIAGNOSIS — K59.00 CONSTIPATION, UNSPECIFIED CONSTIPATION TYPE: Primary | ICD-10-CM

## 2021-06-25 DIAGNOSIS — E11.65 TYPE 2 DIABETES MELLITUS WITH HYPERGLYCEMIA, WITHOUT LONG-TERM CURRENT USE OF INSULIN (H): ICD-10-CM

## 2021-06-25 LAB
BUN SERPL-MCNC: 15.1 MG/DL (ref 7–21)
CALCIUM SERPL-MCNC: 9.7 MG/DL (ref 8.5–10.1)
CHLORIDE SERPLBLD-SCNC: 86.3 MMOL/L (ref 98–110)
CHOLEST SERPL-MCNC: 243 MG/DL
CO2 SERPL-SCNC: 28 MMOL/L (ref 20–32)
CREAT SERPL-MCNC: 0.6 MG/DL (ref 0.7–1.3)
CREAT UR-MCNC: 25.5 MG/DL
FASTING?: ABNORMAL
GFR SERPL CREATININE-BSD FRML MDRD: >90 ML/MIN/1.7 M2
GLUCOSE SERPL-MCNC: 553.4 MG'DL (ref 70–99)
HBA1C MFR BLD: 13.8 % (ref 4.1–5.7)
HDLC SERPL-MCNC: 37 MG/DL
LDLC SERPL CALC-MCNC: ABNORMAL MG/DL
LDLC SERPL DIRECT ASSAY-MCNC: 135 MG/DL
MICROALBUMIN UR-MCNC: <0.5 MG/DL (ref 0–1.99)
MICROALBUMIN/CREAT UR: NORMAL MG/G{CREAT}
POTASSIUM SERPL-SCNC: 4.5 MMOL/L (ref 3.2–4.6)
SODIUM SERPL-SCNC: 131.4 MMOL/L (ref 132–142)
TRIGL SERPL-MCNC: 505 MG/DL

## 2021-06-25 PROCEDURE — 36415 COLL VENOUS BLD VENIPUNCTURE: CPT | Performed by: STUDENT IN AN ORGANIZED HEALTH CARE EDUCATION/TRAINING PROGRAM

## 2021-06-25 PROCEDURE — 80048 BASIC METABOLIC PNL TOTAL CA: CPT | Performed by: STUDENT IN AN ORGANIZED HEALTH CARE EDUCATION/TRAINING PROGRAM

## 2021-06-25 PROCEDURE — 83036 HEMOGLOBIN GLYCOSYLATED A1C: CPT | Performed by: STUDENT IN AN ORGANIZED HEALTH CARE EDUCATION/TRAINING PROGRAM

## 2021-06-25 PROCEDURE — 99214 OFFICE O/P EST MOD 30 MIN: CPT | Mod: GC | Performed by: STUDENT IN AN ORGANIZED HEALTH CARE EDUCATION/TRAINING PROGRAM

## 2021-06-25 RX ORDER — HYDROCORTISONE ACETATE 25 MG/1
25 SUPPOSITORY RECTAL 2 TIMES DAILY
Qty: 12 SUPPOSITORY | Refills: 0 | Status: SHIPPED | OUTPATIENT
Start: 2021-06-25 | End: 2021-06-30

## 2021-06-25 RX ORDER — DOCUSATE SODIUM 100 MG/1
100 CAPSULE, LIQUID FILLED ORAL 2 TIMES DAILY
Qty: 60 CAPSULE | Refills: 0 | Status: SHIPPED | OUTPATIENT
Start: 2021-06-25 | End: 2022-04-04

## 2021-06-25 NOTE — PROGRESS NOTES
Assessment & Plan     Type 2 diabetes mellitus with hyperglycemia, without long-term current use of insulin (H)  We will check diabetes labs today and schedule close follow-up to discuss further management.  - Basic Metabolic Panel ([facility])  - Hemoglobin A1c ([facility])  - Lipid Panel ([facility])  - Microalbumin Random Ur ([facility])    Internal hemorrhoids  Patient's exam was significant for internal hemorrhoids.  There is no anal fissure.  There is no rectal bleeding.  Patient sometimes has itchiness as symptoms.  Will prescribe hydrocortisone suppositories.  Patient states that he would like to have the specialist take a look.  Will refer to colorectal for further work-up and management recommendations.  - hydrocortisone (ANUSOL-HC) 25 MG suppository; Place 1 suppository (25 mg) rectally 2 times daily  - COLORECTAL SURGERY REFERRAL; Future6}     Constipation, unspecified constipation type  We will treat constipation with Colace.  Patient can still take his Dulcolax as needed however I think softening the stool will be more beneficial than a laxative.  - docusate sodium (COLACE) 100 MG capsule; Take 1 capsule (100 mg) by mouth 2 times daily  - COLORECTAL SURGERY REFERRAL; Future      Patient asked to schedule follow-up in 1 to 2 weeks to discuss diabetes management.  Krunal Giron MD  [facility] EDUARDA Sutherland is a 46 year old who presents for the following health issues     HPI   Pt is here today to follow up his diabetes and hemorrhoids.    Patient self manages his diabetes with Metformin and Victoza.  He has previously had an A1c greater than 10.  He does not check his blood sugars frequently because it gives him a headache.  He has had freestyle may in the past however the adhesive caused itching.  Patient says that he has been fasting for Ramadan and has lost some weight and feels that his diabetes is better controlled now.  He would like to check in on his kidney  function to, and would also like to check his blood cholesterol as he has been on the same dose of pravastatin for a long time.    Patient's hemorrhoids have been bothering him for about the last month.  He says he has had 1 month of feeling bumps on his anus.  They are sometimes itchy but not painful.  He says that he does not have any blood in his stool, on the toilet paper, or in general.  Sometimes he is so constipated he has to do manual disimpaction.  He does not have blood on his finger when he does this.  Patient states he has had 10 years of constipation.  He has been treated with softeners in the past but does not like the taste of miralax.  He self manages by taking dulcolax which she has been doing twice daily for 1 month to 1 year.  He is worried about the bumps inside his anus that he feels when he does manual disimpaction's.  He wonders if they could be a cancer.  He would like to see a specialist.    Review of Systems   Constitutional, HEENT, cardiovascular, pulmonary, gi and gu systems are negative, except as otherwise noted.      Objective    /78 (BP Location: Left arm, Patient Position: Sitting, Cuff Size: Adult Regular)   Pulse 72   Temp 97.7  F (36.5  C) (Oral)   Resp 16   Wt 67.6 kg (149 lb)   SpO2 98%   BMI 20.21 kg/m    Body mass index is 20.21 kg/m .  Physical Exam   GENERAL: healthy, alert and no distress  NECK: no adenopathy, no asymmetry, masses, or scars and thyroid normal to palpation  RESP: lungs clear to auscultation - no rales, rhonchi or wheezes  CV: regular rate and rhythm, normal S1 S2, no S3 or S4, no murmur, click or rub, no peripheral edema and peripheral pulses strong  RECTAL (male): normal sphincter tone, no rectal masses and internal hemorrhoids palpated  MS: no gross musculoskeletal defects noted, no edema  Diabetic foot exam: normal DP and PT pulses, no trophic changes or ulcerative lesions and normal sensory exam

## 2021-06-25 NOTE — PATIENT INSTRUCTIONS
1. For diabetes. We will check in with blood tests and urine test.  Please schedule a visit in a couple weeks where we can discuss management after we have checked the lab tests.    2.  Constipation please start taking docusate to help soften your stool.  You can use Dulcolax as well if needed but that is a laxative and will push stool out whereas I think softening stool might help.    3.  For hemorrhoids, if you have softer stool and are not having to push so hard these should be a bit better.  You may use hydrocortisone suppositories up to twice daily if you are experiencing rectal itching or pain.    You should expect to receive a phone call to help schedule an evaluation with colorectal specialist.    06/28/21   [facility]    [address] [place] [address], [address]  [facility], [address]  Phone: [phone]  Fax: [phone]    Faxed referral, demographics and office notes. They will contact patient to schedule.     Alejandra Wetzel

## 2021-06-25 NOTE — PROGRESS NOTES
Preceptor Attestation:    I discussed the patient with the resident and evaluated the patient in person. I have verified the content of the note, which accurately reflects my assessment of the patient and the plan of care.   Supervising Physician:  Onel Menon MD.

## 2021-06-28 ENCOUNTER — MEDICAL CORRESPONDENCE (OUTPATIENT)
Dept: HEALTH INFORMATION MANAGEMENT | Facility: CLINIC | Age: 46
End: 2021-06-28

## 2021-06-29 ENCOUNTER — TELEPHONE (OUTPATIENT)
Dept: FAMILY MEDICINE | Facility: CLINIC | Age: 46
End: 2021-06-29

## 2021-06-29 NOTE — TELEPHONE ENCOUNTER
Dr. Giron,hydrocortisone (ANUSOL-HC)   was prescribed on 6/25/2021, patient's insurance is not covered, too expensive for patient, he is wonder if you can prescribe something else or do PA.  Please advise.    Routed to Dr. Giron

## 2021-06-29 NOTE — TELEPHONE ENCOUNTER
[facility] phone call message- general phone call:    Reason for call: Pt needs prior authorization for hydrocortisone (ANUSOL-HC) 25 MG suppository    Return call needed: Yes    OK to leave a message on voice mail? Yes    Primary language: English      needed? No    Call taken on June 29, 2021 at 2:53 PM by Grisel Flores-Cardona

## 2021-06-30 RX ORDER — HYDROCORTISONE 25 MG/G
CREAM TOPICAL 2 TIMES DAILY PRN
Qty: 28 G | Refills: 0 | Status: SHIPPED | OUTPATIENT
Start: 2021-06-30 | End: 2022-04-04

## 2021-07-01 ENCOUNTER — OFFICE VISIT (OUTPATIENT)
Dept: FAMILY MEDICINE | Facility: CLINIC | Age: 46
End: 2021-07-01
Payer: COMMERCIAL

## 2021-07-01 VITALS
DIASTOLIC BLOOD PRESSURE: 84 MMHG | BODY MASS INDEX: 20.26 KG/M2 | RESPIRATION RATE: 16 BRPM | TEMPERATURE: 97.9 F | HEART RATE: 88 BPM | WEIGHT: 149.4 LBS | OXYGEN SATURATION: 98 % | SYSTOLIC BLOOD PRESSURE: 127 MMHG

## 2021-07-01 DIAGNOSIS — E78.5 HYPERLIPIDEMIA LDL GOAL <100: Primary | ICD-10-CM

## 2021-07-01 DIAGNOSIS — E11.9 TYPE 2 DIABETES MELLITUS WITHOUT COMPLICATION, WITH LONG-TERM CURRENT USE OF INSULIN (H): ICD-10-CM

## 2021-07-01 DIAGNOSIS — Z79.4 TYPE 2 DIABETES MELLITUS WITHOUT COMPLICATION, WITH LONG-TERM CURRENT USE OF INSULIN (H): ICD-10-CM

## 2021-07-01 PROCEDURE — 99214 OFFICE O/P EST MOD 30 MIN: CPT | Mod: GC | Performed by: STUDENT IN AN ORGANIZED HEALTH CARE EDUCATION/TRAINING PROGRAM

## 2021-07-01 RX ORDER — ATORVASTATIN CALCIUM 20 MG/1
20 TABLET, FILM COATED ORAL DAILY
Qty: 90 TABLET | Refills: 1 | Status: SHIPPED | OUTPATIENT
Start: 2021-07-01 | End: 2021-11-30

## 2021-07-01 RX ORDER — LIRAGLUTIDE 6 MG/ML
1.2 INJECTION SUBCUTANEOUS DAILY
Qty: 6 ML | Refills: 3 | Status: SHIPPED | OUTPATIENT
Start: 2021-07-01 | End: 2022-03-22

## 2021-07-01 RX ORDER — FLASH GLUCOSE SENSOR
KIT MISCELLANEOUS
Qty: 2 EACH | Refills: 11 | Status: SHIPPED | OUTPATIENT
Start: 2021-07-01 | End: 2022-04-04

## 2021-07-01 RX ORDER — CETIRIZINE HYDROCHLORIDE 10 MG/1
10 TABLET ORAL DAILY
Qty: 90 TABLET | Refills: 0 | Status: SHIPPED | OUTPATIENT
Start: 2021-07-01 | End: 2022-04-04

## 2021-07-01 NOTE — PROGRESS NOTES
Assessment & Plan     Type 2 diabetes mellitus without complication, with long-term current use of insulin (H)  Given patient's reports of not using his Victoza prior to extreme elevations of his blood glucose last week, and improvement was using Victoza, will proceed with just increasing the dose of Victoza for now and monitoring for 2 weeks with CGM at which point we can make changes to his diabetes regimen.  - liraglutide (VICTOZA) 18 MG/3ML solution; Inject 1.2 mg Subcutaneous daily  - cetirizine (ZYRTEC) 10 MG tablet; Take 1 tablet (10 mg) by mouth daily  - Continuous Blood Gluc Sensor (FREESTYLE EPTE 14 DAY SENSOR) MISC; Change every 14 days.    Hyperlipidemia LDL goal <100  Patient with elevated LDL on testing, with use of low intensity statin with pravastatin.  Will increase to moderate intensity with atorvastatin.  - atorvastatin (LIPITOR) 20 MG tablet; Take 1 tablet (20 mg) by mouth daily    Patient should follow-up in 2 weeks    Precepted with Dr. Crisot Giron MD  [facility]    Sherif Sutherland is a 46 year old who presents for the following health issues     HPI   Pt here for diabetes follow up after screening labs showed and A1c of 13.8. Pt has been taking his medicines and checking his blood sugar since our visit last week. He says that his blood sugars have been ranging from 200-350 since. Prior to his visit last week he had not been taking his Victoza.     Pt denies nocturia, he does not have any numbness or tingling in his hands or feet and his vision is not changing.      Review of Systems   Constitutional, HEENT, cardiovascular, pulmonary, gi and gu systems are negative, except as otherwise noted.      Objective    /84 (BP Location: Left arm, Patient Position: Sitting, Cuff Size: Adult Regular)   Pulse 88   Temp 97.9  F (36.6  C) (Oral)   Resp 16   Wt 67.8 kg (149 lb 6.4 oz)   SpO2 98%   BMI 20.26 kg/m    Body mass index is  20.26 kg/m .  Physical Exam   GENERAL: healthy, alert and no distress  NECK: no adenopathy, no asymmetry, masses, or scars  RESP: lungs clear to auscultation - no rales, rhonchi or wheezes  CV: regular rate and rhythm, normal S1 S2, no S3 or S4, no murmur, click or rub, no peripheral edema and peripheral pulses strong  ABDOMEN: soft, nontender  Neuro: Grossly normal  Psych: Normal mood and affect

## 2021-07-01 NOTE — PROGRESS NOTES
Preceptor Attestation:    I discussed the patient with the resident and evaluated the patient in person. I have verified the content of the note, which accurately reflects my assessment of the patient and the plan of care.   Supervising Physician:  Cristo Jack MD.

## 2021-08-03 ENCOUNTER — TRANSFERRED RECORDS (OUTPATIENT)
Dept: HEALTH INFORMATION MANAGEMENT | Facility: CLINIC | Age: 46
End: 2021-08-03

## 2021-11-30 DIAGNOSIS — E78.5 HYPERLIPIDEMIA LDL GOAL <100: ICD-10-CM

## 2021-11-30 RX ORDER — ATORVASTATIN CALCIUM 20 MG/1
20 TABLET, FILM COATED ORAL DAILY
Qty: 90 TABLET | Refills: 3 | Status: SHIPPED | OUTPATIENT
Start: 2021-11-30 | End: 2022-01-25

## 2022-01-25 RX ORDER — ATORVASTATIN CALCIUM 20 MG/1
20 TABLET, FILM COATED ORAL DAILY
Qty: 90 TABLET | Refills: 3 | Status: SHIPPED | OUTPATIENT
Start: 2022-01-25 | End: 2023-01-30

## 2022-03-15 ENCOUNTER — TRANSFERRED RECORDS (OUTPATIENT)
Dept: HEALTH INFORMATION MANAGEMENT | Facility: CLINIC | Age: 47
End: 2022-03-15
Payer: COMMERCIAL

## 2022-03-22 DIAGNOSIS — E11.9 TYPE 2 DIABETES MELLITUS WITHOUT COMPLICATION, WITH LONG-TERM CURRENT USE OF INSULIN (H): ICD-10-CM

## 2022-03-22 DIAGNOSIS — Z79.4 TYPE 2 DIABETES MELLITUS WITHOUT COMPLICATION, WITH LONG-TERM CURRENT USE OF INSULIN (H): ICD-10-CM

## 2022-03-22 RX ORDER — LIRAGLUTIDE 6 MG/ML
1.2 INJECTION SUBCUTANEOUS DAILY
Qty: 6 ML | Refills: 3 | Status: SHIPPED | OUTPATIENT
Start: 2022-03-22 | End: 2022-04-04

## 2022-04-04 ENCOUNTER — OFFICE VISIT (OUTPATIENT)
Dept: PHARMACY | Facility: CLINIC | Age: 47
End: 2022-04-04
Payer: COMMERCIAL

## 2022-04-04 ENCOUNTER — OFFICE VISIT (OUTPATIENT)
Dept: FAMILY MEDICINE | Facility: CLINIC | Age: 47
End: 2022-04-04
Payer: COMMERCIAL

## 2022-04-04 VITALS
SYSTOLIC BLOOD PRESSURE: 99 MMHG | RESPIRATION RATE: 18 BRPM | DIASTOLIC BLOOD PRESSURE: 65 MMHG | TEMPERATURE: 98.3 F | BODY MASS INDEX: 19.8 KG/M2 | HEART RATE: 80 BPM | OXYGEN SATURATION: 96 % | WEIGHT: 146 LBS

## 2022-04-04 DIAGNOSIS — E11.65 TYPE 2 DIABETES MELLITUS WITH HYPERGLYCEMIA, WITHOUT LONG-TERM CURRENT USE OF INSULIN (H): Primary | ICD-10-CM

## 2022-04-04 DIAGNOSIS — E55.9 VITAMIN D DEFICIENCY: ICD-10-CM

## 2022-04-04 DIAGNOSIS — Z12.11 COLON CANCER SCREENING: ICD-10-CM

## 2022-04-04 DIAGNOSIS — R10.13 DYSPEPSIA: ICD-10-CM

## 2022-04-04 DIAGNOSIS — J30.2 SEASONAL ALLERGIC RHINITIS, UNSPECIFIED TRIGGER: ICD-10-CM

## 2022-04-04 DIAGNOSIS — E78.5 DYSLIPIDEMIA: ICD-10-CM

## 2022-04-04 DIAGNOSIS — K59.00 CONSTIPATION: ICD-10-CM

## 2022-04-04 DIAGNOSIS — J30.2 SEASONAL ALLERGIC RHINITIS: ICD-10-CM

## 2022-04-04 DIAGNOSIS — E11.9 TYPE 2 DIABETES MELLITUS WITHOUT COMPLICATION, WITH LONG-TERM CURRENT USE OF INSULIN (H): Primary | ICD-10-CM

## 2022-04-04 DIAGNOSIS — K64.8 INTERNAL HEMORRHOIDS: ICD-10-CM

## 2022-04-04 DIAGNOSIS — Z79.4 TYPE 2 DIABETES MELLITUS WITHOUT COMPLICATION, WITH LONG-TERM CURRENT USE OF INSULIN (H): Primary | ICD-10-CM

## 2022-04-04 DIAGNOSIS — E11.9 TYPE 2 DIABETES MELLITUS WITHOUT COMPLICATION, WITH LONG-TERM CURRENT USE OF INSULIN (H): ICD-10-CM

## 2022-04-04 DIAGNOSIS — K59.00 CONSTIPATION, UNSPECIFIED CONSTIPATION TYPE: ICD-10-CM

## 2022-04-04 DIAGNOSIS — Z79.4 TYPE 2 DIABETES MELLITUS WITHOUT COMPLICATION, WITH LONG-TERM CURRENT USE OF INSULIN (H): ICD-10-CM

## 2022-04-04 DIAGNOSIS — E78.5 HYPERLIPIDEMIA LDL GOAL <100: ICD-10-CM

## 2022-04-04 DIAGNOSIS — K21.9 GASTROESOPHAGEAL REFLUX DISEASE WITHOUT ESOPHAGITIS: ICD-10-CM

## 2022-04-04 LAB
ANION GAP SERPL CALCULATED.3IONS-SCNC: 12 MMOL/L (ref 5–18)
BUN SERPL-MCNC: 9 MG/DL (ref 8–22)
CALCIUM SERPL-MCNC: 9.4 MG/DL (ref 8.5–10.5)
CHLORIDE BLD-SCNC: 98 MMOL/L (ref 98–107)
CO2 SERPL-SCNC: 26 MMOL/L (ref 22–31)
CREAT SERPL-MCNC: 0.77 MG/DL (ref 0.7–1.3)
GFR SERPL CREATININE-BSD FRML MDRD: >90 ML/MIN/1.73M2
GLUCOSE BLD-MCNC: 311 MG/DL (ref 70–125)
HBA1C MFR BLD: 12.2 % (ref 0–5.6)
POTASSIUM BLD-SCNC: 4.2 MMOL/L (ref 3.5–5)
SODIUM SERPL-SCNC: 136 MMOL/L (ref 136–145)

## 2022-04-04 PROCEDURE — 99605 MTMS BY PHARM NP 15 MIN: CPT | Performed by: PHARMACIST

## 2022-04-04 PROCEDURE — 99214 OFFICE O/P EST MOD 30 MIN: CPT | Mod: GC | Performed by: STUDENT IN AN ORGANIZED HEALTH CARE EDUCATION/TRAINING PROGRAM

## 2022-04-04 PROCEDURE — 83036 HEMOGLOBIN GLYCOSYLATED A1C: CPT | Performed by: STUDENT IN AN ORGANIZED HEALTH CARE EDUCATION/TRAINING PROGRAM

## 2022-04-04 PROCEDURE — 80048 BASIC METABOLIC PNL TOTAL CA: CPT | Performed by: STUDENT IN AN ORGANIZED HEALTH CARE EDUCATION/TRAINING PROGRAM

## 2022-04-04 PROCEDURE — 36415 COLL VENOUS BLD VENIPUNCTURE: CPT | Performed by: STUDENT IN AN ORGANIZED HEALTH CARE EDUCATION/TRAINING PROGRAM

## 2022-04-04 PROCEDURE — 99607 MTMS BY PHARM ADDL 15 MIN: CPT | Performed by: PHARMACIST

## 2022-04-04 RX ORDER — METFORMIN HCL 500 MG
2000 TABLET, EXTENDED RELEASE 24 HR ORAL DAILY
Qty: 360 TABLET | Refills: 3 | Status: SHIPPED | OUTPATIENT
Start: 2022-04-04 | End: 2023-02-28

## 2022-04-04 RX ORDER — FAMOTIDINE 20 MG/1
TABLET, FILM COATED ORAL
Qty: 60 TABLET | Refills: 4 | Status: SHIPPED | OUTPATIENT
Start: 2022-04-04

## 2022-04-04 RX ORDER — CETIRIZINE HYDROCHLORIDE 10 MG/1
TABLET ORAL
COMMUNITY
Start: 2022-04-04

## 2022-04-04 RX ORDER — FLASH GLUCOSE SENSOR
KIT MISCELLANEOUS
Qty: 2 EACH | Refills: 11 | Status: SHIPPED | OUTPATIENT
Start: 2022-04-04 | End: 2022-07-13

## 2022-04-04 RX ORDER — DOCUSATE SODIUM 100 MG/1
100 CAPSULE, LIQUID FILLED ORAL 2 TIMES DAILY
Qty: 60 CAPSULE | Refills: 11 | Status: SHIPPED | OUTPATIENT
Start: 2022-04-04

## 2022-04-04 RX ORDER — LIRAGLUTIDE 6 MG/ML
1.8 INJECTION SUBCUTANEOUS DAILY
Qty: 9 ML | Refills: 11 | Status: SHIPPED | OUTPATIENT
Start: 2022-04-04 | End: 2023-04-20

## 2022-04-04 NOTE — PROGRESS NOTES
Preceptor Attestation:  I discussed the patient with the resident and evaluated the patient in person. I have verified the content of the note, which accurately reflects my assessment of the patient and the plan of care.  Supervising Physician:  Tennille Herman MD.

## 2022-04-04 NOTE — PROGRESS NOTES
[facility] Clinic Visit    Assessment & Plan     Type 2 diabetes mellitus with hyperglycemia, without long-term current use of insulin (H)  PharmD met with patient prior to our visit, uptitrating his Victoza and switching to Metformin XR to assist with medication compliance.  We will also resend continuous glucose monitor.  - Continuous Blood Gluc Sensor (FREESTYLE PETE 14 DAY SENSOR) MISC; Change every 14 days.  Dispense: 2 each; Refill: 11  - liraglutide (VICTOZA) 18 MG/3ML solution; Inject 1.8 mg Subcutaneous daily  Dispense: 9 mL; Refill: 11  - Hemoglobin A1c  - Basic metabolic panel  - metFORMIN (GLUCOPHAGE-XR) 500 MG 24 hr tablet; Take 4 tablets (2,000 mg) by mouth daily  Dispense: 360 tablet; Refill: 3  - docusate sodium (COLACE) 100 MG capsule; Take 1 capsule (100 mg) by mouth 2 times daily  Dispense: 60 capsule; Refill: 11    Colon cancer screening  - Adult Gastro Ref - Procedure Only; Future    Dyslipidemia  Hyperlipidemia LDL goal <100  Continue statin     Internal hemorrhoids  - Adult Gastro Ref - Procedure Only; Future    Constipation, unspecified constipation type  - docusate sodium (COLACE) 100 MG capsule; Take 1 capsule (100 mg) by mouth 2 times daily  Dispense: 60 capsule; Refill: 11    Dyspepsia  - famotidine (PEPCID) 20 MG tablet; Take 1 tablet 1-2 times daily  Dispense: 60 tablet; Refill: 4    Vitamin D deficiency  - cholecalciferol 125 MCG (5000 UT) CAPS; Take 1 capsule (5,000 Units) by mouth daily    Seasonal allergic rhinitis, unspecified trigger  - cetirizine (ZYRTEC) 10 MG tablet; Takes 1 tab daily seasonally for allergies    RTC in 2 weeks for follow up of blood sugars or sooner if develops new or worsening symptoms.    Options for treatment and follow-up care were reviewed with the patient who was engaged and actively involved in the decision making process, verbalized understanding of the options discussed, and satisfied with the final plan.    Patient was staffed with  supervising physician, Dr. Herman, who agrees with the assessment and plan.    Lena Mejía MD, PGY3  [facility]    Patient Instructions     STOP metformin 1000mg regular release    START metformin extended release (ER) 500mg- take 4 tablets ONCE daily    INCREASE Victoza to 1.2mg daily for 1 week, then 1.8mg once daily    FILL and use the FREESTYLE PETE blood sugar sensor    STOP Prilosec    START famotidine 20mg 1-2 times daily as needed    STOP Dulcolax/bisacodyl or other constipation medicine    START docusate 100mg once daily every day (stool softener)    Referral has been placed for colonoscopy - someone will help arrange this.    Sweats and memory issues may be due to the high sugars - adjust medications as above. If still having issues we need to do further lab work and consider neurologic testing.          Subjective   Koko Milton is a 47 year old male who presents for DM Check.     Chief Complaints and History of Present Illnesses   Patient presents with     Diabetes     Followup     Referral       Patient is checking blood sugars: intermittently - no longer using CGM.      Lab Results   Component Value Date    A1C 12.2 04/04/2022    A1C 13.8 06/25/2021    A1C 13.2 02/18/2021    A1C 10.6 02/13/2020    A1C 9.4 06/18/2019    A1C 10.8 01/16/2019     Here today for diabetes check.  He down titrated his Victoza due to Ramadan, typically misses evening dose of Metformin.  A1c slightly improved from prior but still significantly elevated.  He does have night sweats and feels confused when his blood sugar goes too high.  He has no numbness or tingling.  No dysuria.  Is still taking his statin.    In addition to diabetes check would like a referral for evaluation of his hemorrhoids and a colonoscopy.  Has been taking Dulcolax, no other stool softener use.    He has burning pain after he eats, it typically is worse when he is resting flat.  He is concerned that this is his heart  rather than his abdomen.  He reports alleviation with Prilosec.    Patient Active Problem List    Diagnosis Date Noted     Type 2 diabetes mellitus without complication, with long-term current use of insulin (H) 09/12/2017     Priority: Medium     Tendinopathy of right rotator cuff 05/31/2016     Priority: Medium     PT ordered 5/31/2016       Sneezing with watery eyes 05/31/2016     Priority: Medium     Loratadine ordered 5/31/16.       Dyslipidemia 04/22/2015     Priority: Medium     ED (erectile dysfunction) 04/22/2015     Priority: Medium     Current Outpatient Medications   Medication Instructions     Alcohol Swabs PADS 1 each, Does not apply, DAILY, With Victoza injection. Allow to dry before injecting     atorvastatin (LIPITOR) 20 mg, Oral, DAILY     BD PEN NEEDLE NIKIA 2ND GEN 32G X 4 MM miscellaneous USE PEN NEEDLES DAILY OR AS DIRECTED WITH INSULIN     blood glucose (NO BRAND SPECIFIED) lancets standard Use to test blood sugar 4 times daily or as directed.     blood glucose (ONETOUCH ULTRA) test strip Use to test blood sugars 4 times daily or as directed.     blood glucose monitoring (ACCU-CHEK CINDY PLUS) meter device kit Use to test blood sugars 4 times daily or as directed.     cetirizine (ZYRTEC) 10 mg, Oral, DAILY     Continuous Blood Gluc Sensor (FREESTYLE PETE 14 DAY SENSOR) MISC Change every 14 days.     docusate sodium (COLACE) 100 mg, Oral, 2 TIMES DAILY     hydrocortisone, Perianal, (HYDROCORTISONE) 2.5 % cream Rectal, 2 TIMES DAILY PRN     liraglutide (VICTOZA) 1.2 mg, Subcutaneous, DAILY     metFORMIN (GLUCOPHAGE) 1000 MG tablet TAKE 1 TABLET(1000 MG) BY MOUTH TWICE DAILY WITH MEALS     pravastatin (PRAVACHOL) 40 mg, Oral, DAILY     vitamin D3 (CHOLECALCIFEROL) 50 mcg, Oral, DAILY       Social: He reports that he has never smoked. He has never used smokeless tobacco. He reports that he does not drink alcohol and does not use drugs.    There are no exam notes on file for this  visit.    Objective     Vitals:    04/04/22 1450   BP: 99/65   Pulse: 80   Resp: 18   Temp: 98.3  F (36.8  C)   SpO2: 96%   Weight: 66.2 kg (146 lb)     Body mass index is 19.8 kg/m .    GEN: NAD, healthy, alert  HEENT: NC/AT, EOMI, normal conjunctivae/sclerae, clear oropharynx, MMM  RESP: CTAB, no w/r/r  CV: RRR, nl S1/S2, no m/r/g, no peripheral edema  ABD: soft, NT/ND, +BS throughout  MSK: no MSK defects noted, gait is age appropriate w/o ataxia  SKIN: no suspicious lesions or rashes  NEURO: no obvious focal deficits  PSYCH: mentation appears normal, affect normal/bright    Labs:  Office Visit on 04/04/2022   Component Date Value Ref Range Status     Hemoglobin A1C 04/04/2022 12.2 (A) 0.0 - 5.6 % Final    Normal <5.7%   Prediabetes 5.7-6.4%    Diabetes 6.5% or higher     Note: Adopted from ADA consensus guidelines.

## 2022-04-04 NOTE — PATIENT INSTRUCTIONS
STOP metformin 1000mg regular release    START metformin extended release (ER) 500mg- take 4 tablets ONCE daily    INCREASE Victoza to 1.2mg daily for 1 week, then 1.8mg once daily    FILL and use the FREESTYLE PETE blood sugar sensor    STOP Prilosec    START famotidine 20mg 1-2 times daily as needed    STOP Dulcolax/bisacodyl or other constipation medicine    START docusate 100mg once daily every day (stool softener)    Referral has been placed for colonoscopy - someone will help arrange this.    Sweats and memory issues may be due to the high sugars - adjust medications as above. If still having issues we need to do further lab work and consider neurologic testing.        04/06/22   GASTROENTEROLOGY REFERRAL  [facility]  Phone [phone]  Fax: [phone]    Online referral placed with [facility] who will contact patient to schedule.     Maria Eugenia Lozoya

## 2022-04-04 NOTE — PROGRESS NOTES
Medication Therapy Management (MTM) Encounter    ASSESSMENT:                            Medication Adherence/Access: See below for considerations and Advised use of pill boxes to help med adherence. He declined.    Type 2 diabetes: Currently uncontrolled with an A1c above goal of > 7% at 12.2%. Typically would recommend starting insulin, however with the patient participating in Ramadan and only eating from 8 pm until bedtime, would prefer to remain on medications without the risk of hypoglycemia. The patient would benefit from education on metformin and Victoza being taken daily at the same dose regardless of current blood sugar. Would also benefit from metformin formulation change to extended release to improve compliance and Victoza dose titration to highest dose for maximum A1c lowering. The patient should also be educated on importance of continuous use of CGM.    Constipation: Currently unstable with frequent symptoms of constipation, resulting in the patient taking a higher than recommended dose of bisacodyl every few days. Would benefit from a stool softener taken daily to prevent recurrent constipation.    GERD: Currently reports no symptoms with omeprazole, therefore would benefit from switch to H2 blocker as needed as the patient likely does not need a PPI and the patient would benefit from a medication that will likely be as effective with a lower risk burden.    Seasonal allergies: Currently stable.    Vitamin D deficiency: Currently stable.    PLAN:                              STOP metformin 1000mg regular release    START metformin extended release (ER) 500mg- take 4 tablets ONCE daily    INCREASE Victoza to 1.2mg daily for 1 week, then 1.8mg once daily    FILL and use the FREESTYLE PETE blood sugar sensor (educated that these were accurate readings, consistent with his A1C)    STOP Prilosec    START famotidine 20mg 1-2 times daily as needed    STOP Dulcolax/bisacodyl or other constipation  medicine    START docusate 100mg once daily every day (stool softener)    Follow-up: Return in about 2 weeks (around 4/18/2022).    Medication issues to be addressed at a future visit:      Assess need for insulin after completion of Ramadan if CGM data interpretation estimates an A1c > 10%    Addition of SGLT2i    SUBJECTIVE/OBJECTIVE:                          Koko Milton is a 47 year old male seen for an initial visit. He was referred to me from Dr. Mejía. Today's visit is a co-visit with Dr. Mejía. Patient is seeing provider after our visit today.    Reason for visit: MTM.    Allergies/ADRs: Reviewed in chart  Past Medical History: Reviewed in chart  Social History     Tobacco Use     Smoking status: Never Smoker     Smokeless tobacco: Never Used   Substance Use Topics     Alcohol use: No     Drug use: No     ^Reviewed today    Medication Adherence/Access: Patient takes medications directly from bottles, is not interested in a pill box at this time. Patient takes medications 2 time(s) per day. Taking metformin in the morning and the evening, however reports forgetting evening dose ~70% of the time. Victoza patient takes 0.6 mg currently during Ramadan, however previously was taking 0.6 mg or 1.2 mg about 50% of the time for each depending on what he eats. Does not miss other medications. His dose had been increased to 1.2mg daily but he did not do this.    Type 2 Diabetes:  Currently taking metformin 1000 mg twice daily and Victoza 0.6 mg daily (see above for adherence issues). Patient is not experiencing side effects. Reports frequent forgetfulness of second dose of metformin, however may also occasionally take a double dose after forgetting whether he took the medication. With Victoza, has been using 1.2 mg or 0.6 mg based on diet, however with initiation of Ramadan on 4/1, has been using 0.6 mg daily.  Has a Freestyle CGM, however has not been using it as he was frustrated with the high numbers he was  "seeing (300-500s when using in December 2021), even after eating well and working out. Is willing to make any changes recommended.    Blood sugar monitoring: not currently  Symptoms of low blood sugar? none  Symptoms of high blood sugar? None in last 3 months  Diet/Exercise: eats rice, meats, veggies, fish, goes to gym regularly  Aspirin: No  The 10-year ASCVD risk score (Imani LACEY Jr., et al., 2013) is: 5.9%    Values used to calculate the score:      Age: 47 years      Sex: Male      Is Non- : No      Diabetic: Yes      Tobacco smoker: No      Systolic Blood Pressure: 99 mmHg      Is BP treated: No      HDL Cholesterol: 37 mg/dL      Total Cholesterol: 243 mg/dL  Statin: Yes: atorvastatin 20 mg daily   ACEi/ARB: No.   Urine Albumin:   Lab Results   Component Value Date    UMALCR See Note. 06/25/2021      Lab Results   Component Value Date    A1C 12.2 04/04/2022    A1C 13.8 06/25/2021    A1C 13.2 02/18/2021    A1C 10.6 02/13/2020    A1C 9.4 06/18/2019    A1C 10.8 01/16/2019     Most Recent Immunizations   Administered Date(s) Administered     COVID-19,PF,Pfizer (12+ Yrs) 12/03/2021     TDAP Vaccine (Boostrix) 06/18/2019      Constipation: Currently taking bisacodyl 5 mg 1-3 tablets daily as needed. Patient is not experiencing side effects. Takes the bisacodyl, then constipation resolves, then a few days later needs to take again. Reports struggling with constipation for ~14 years. Has improved diet to \"lighter foods\" and more vegetables recently which has improved the constipation, however has not resolved it. Has tried Miralax in the past and did not like it due to the taste.    GERD: Currently taking omeprazole 20 mg daily. Patient is not experiencing side effects. Recently started taking on his own over the counter for heartburn with significant improvement.    Seasonal allergies: Currently taking cetirizine 10 mg daily as needed. Patient is not experiencing side effects. Reports only " needing to take during the summer months.    Vitamin D deficiency: Currently taking vitamin D 2000 units daily. Patient is not experiencing side effects.    BP Readings from Last 1 Encounters:   04/04/22 99/65     Pulse Readings from Last 1 Encounters:   04/04/22 80     Wt Readings from Last 1 Encounters:   04/04/22 146 lb (66.2 kg)     Ht Readings from Last 1 Encounters:   01/16/19 6' (1.829 m)     Estimated body mass index is 19.8 kg/m  as calculated from the following:    Height as of 1/16/19: 6' (1.829 m).    Weight as of an earlier encounter on 4/4/22: 146 lb (66.2 kg).    Temp Readings from Last 1 Encounters:   04/04/22 98.3  F (36.8  C)     ----------------    I spent 30 minutes with this patient today. Dr. Mejía was provided the recommendations above  in clinic today and Dr. Herman is the authorizing prescriber for this visit through the pharmacist collaborative practice agreement.     The patient was given a summary of these recommendations. See Provider note/AVS from today.     Reba Watson, Pharmacy Student    I was present with the pharmacy student who participated in the service and in the documentation of this note. I have verified the history, personally performed the medical decision making, and have verified the content of the note, which accurately reflects my assessment of the patient and the plan of care.   Mary Santo, [facility], PharmD      Medication Therapy Recommendations  Constipation    Rationale: More effective medication available - Ineffective medication - Effectiveness   Recommendation: Change Medication - docusate sodium 100 MG capsule   Status: Accepted per CPA         Gastroesophageal reflux disease without esophagitis    Rationale: More effective medication available - Ineffective medication - Effectiveness   Recommendation: Change Medication - famotidine 20 MG tablet   Status: Accepted per CPA         Type 2 diabetes mellitus without complication, with long-term current use  of insulin (H)    Current Medication: Continuous Blood Gluc Sensor (FREESTYLE PETE 14 DAY SENSOR) MISC   Rationale: Patient prefers not to take - Adherence - Adherence   Recommendation: Provide Education - FreeStyle Pete 2 Sensor Misc   Status: Accepted - no CPA Needed          Current Medication: liraglutide (VICTOZA) 18 MG/3ML solution   Rationale: Dose too low - Dosage too low - Effectiveness   Recommendation: Increase Dose - VICTOZA PEN 18 MG/3ML soln   Status: Accepted per Provider          Current Medication: metFORMIN (GLUCOPHAGE) 1000 MG tablet (Discontinued)   Rationale: Patient forgets to take - Adherence - Adherence   Recommendation: Change Medication Formulation  - metFORMIN 500 MG 24 hr tablet   Status: Accepted per Provider

## 2022-04-20 ENCOUNTER — OFFICE VISIT (OUTPATIENT)
Dept: FAMILY MEDICINE | Facility: CLINIC | Age: 47
End: 2022-04-20
Payer: COMMERCIAL

## 2022-04-20 ENCOUNTER — OFFICE VISIT (OUTPATIENT)
Dept: PHARMACY | Facility: CLINIC | Age: 47
End: 2022-04-20
Payer: COMMERCIAL

## 2022-04-20 VITALS
BODY MASS INDEX: 20.1 KG/M2 | HEART RATE: 82 BPM | TEMPERATURE: 97 F | WEIGHT: 148.2 LBS | DIASTOLIC BLOOD PRESSURE: 77 MMHG | OXYGEN SATURATION: 97 % | SYSTOLIC BLOOD PRESSURE: 117 MMHG | RESPIRATION RATE: 20 BRPM

## 2022-04-20 DIAGNOSIS — E11.69 TYPE 2 DIABETES MELLITUS WITH OTHER SPECIFIED COMPLICATION, WITHOUT LONG-TERM CURRENT USE OF INSULIN (H): Primary | ICD-10-CM

## 2022-04-20 DIAGNOSIS — Z71.89 ENCOUNTER FOR MEDICATION REVIEW AND COUNSELING: Primary | ICD-10-CM

## 2022-04-20 DIAGNOSIS — K59.00 CONSTIPATION, UNSPECIFIED CONSTIPATION TYPE: ICD-10-CM

## 2022-04-20 DIAGNOSIS — R10.13 DYSPEPSIA: ICD-10-CM

## 2022-04-20 PROCEDURE — 99207 PR NO CHARGE LOS: CPT

## 2022-04-20 PROCEDURE — 99214 OFFICE O/P EST MOD 30 MIN: CPT | Mod: GC | Performed by: STUDENT IN AN ORGANIZED HEALTH CARE EDUCATION/TRAINING PROGRAM

## 2022-04-20 RX ORDER — FLASH GLUCOSE SCANNING READER
1 EACH MISCELLANEOUS ONCE
Qty: 1 EACH | Refills: 0 | Status: SHIPPED | OUTPATIENT
Start: 2022-04-20 | End: 2022-04-20

## 2022-04-20 NOTE — PROGRESS NOTES
There are no exam notes on file for this visit.    Assessment & Plan    1. Type 2 diabetes mellitus with other specified complication, without long-term current use of insulin (H)  Sugars improving since increasing liraglutide. Requests Freestyle may reader as his previous one broke. Difficult to manage his diabetes while he is fasting for Ramadan.   - follow up in 3-4 weeks when you have been back to a regular diet for at least 1 week after Ramadan  - Continuous Blood Gluc  (FREESTYLE MAY 14 DAY READER) LORRAINE; 1 each once for 1 dose Use to read blood sugars per 's instructions.  Dispense: 1 each; Refill: 0    2. Dyspepsia  - Helicobacter pylori Antigen Stool    3. Constipation, unspecified constipation type  - increase colace to 2 tabs daily  - increase fiber in diet when able  - increase water intake when able  - dicussed potentially starting miralax if colace does not work well for patient  - consider rechecking TSH    Review of prior external note(s) from - prior clinic visits  Review of the result(s) of each unique test - HA1c, most recent TSH  Ordering of each unique test  Prescription drug management  40 minutes spent on the date of the encounter doing chart review, history and exam, documentation and further activities per the note    No follow-ups on file.    Benita Behm, MD PGY2  [facility]  04/20/22    I precepted today with Dr. Stout.    Sherif Milton is a 47 year old who presents for the following health issues: DM follow up    HPI    Diabetes Follow-up    Patient is checking checking blood sugars: four times daily, diabetes has been uncontrolled and medications have been adjusted 2 weeks ago. Fasting sugars were 200-380 before increase liraglutide to 1.8 mg, after increasing to 1.8 mg, fasting sugars now 110-210. Sugars are still often over 200, sugars highest after eating at night after fast. Patient is currently fasting most of the day  for Ramadan, 11 more days of fasting left. He has not noticed medication side effects, now taking 1.8 mg liraglutide daily and 2000 mg XR metformin.            -Last A1C was   Lab Results   Component Value Date    A1C 12.2 04/04/2022    A1C 13.8 06/25/2021    A1C 13.2 02/18/2021    A1C 10.6 02/13/2020    A1C 9.4 06/18/2019    A1C 10.8 01/16/2019       Patient requests H pylori testing as he is experiencing bloating, epigastric discomfort, nausea, and heartburn AFTER eating. He is taking antacids which help just a little. He has no personal history of H pylori to his knowledge and no one else in the home has had H pylori.     Patient is taking 1 tab colace daily for constipation but it is of minimal benefit to him.     Review of Systems  Constitutional, HEENT, cardiovascular, pulmonary, GI, , musculoskeletal, neuro, skin, endocrine and psych systems are negative, except as otherwise noted.    Objective   There were no vitals taken for this visit.  Physical Exam    Constitutional: Awake, alert, cooperative, no apparent distress  Eyes: Lids and lashes normal, pupils equal, round and reactive to light, extra ocular muscles intact, sclera clear, conjunctiva normal.  ENT: Normocephalic, without obvious abnormality, atraumatic, external ears without lesions.  Neck: Supple, symmetrical, trachea midline, skin normal.  Lungs: No increased work of breathing, good air exchange, clear to auscultation bilaterally, no crackles or wheezing.  Cardiovascular: Regular rate and rhythm, normal S1 and S2, no S3 or S4, and no murmur noted.  Abdomen: Normal bowel sounds, soft, non-distended, TTP in epigastrum, no masses palpated, no hepatosplenomegally.  Musculoskeletal: Full range of motion noted. Tone is normal.  Neurologic: Awake, alert, oriented to name, place and time.  Cranial nerves II-XII are grossly intact. Gait is normal.  Neuropsychiatric: Normal affect, mood, orientation, memory and insight.  Skin: No visible rashes,  erythema, pallor, petechia or purpura.    ----- Service Performed and Documented by Resident or Fellow ------

## 2022-04-20 NOTE — PROGRESS NOTES
Preceptor Attestation:    I discussed the patient with the resident and evaluated the patient in person. I have verified the content of the note, which accurately reflects my assessment of the patient and the plan of care.  /77   Pulse 82   Temp 97  F (36.1  C) (Oral)   Resp 20   Wt 67.2 kg (148 lb 3.2 oz)   SpO2 97%   BMI 20.10 kg/m     Supervising Physician:  Conrado Stout MD.

## 2022-04-20 NOTE — PATIENT INSTRUCTIONS
- take docusate (colace) 1 tab in the morning, 1 at night  - when you are able, increase fiber in your diet with two peeled kiwis/day or 4 prunes have adequate amounts of fiber. Pears and lynn also high in fiber  - when able, drink more water every day to help with constipation  - continue 4 tabs of metformin at once  - continue 1.8 of liraglutide   - bring in stool test after you provide a sample, we will call you with the results  - follow up in 3-4 weeks

## 2022-04-20 NOTE — PROGRESS NOTES
Clinical Pharmacy Consult:                                                    Koko Milton is a 47 year old male seen for a clinical pharmacist consult.  He was referred to me from Dr. Behm.     Reason for Consult: Libreview setup.    Discussion: Patient is using his phone as a reader. Would benefit from linking patient's phone to [employer] so we can monitor glycemic control remotely.     Plan:  1. Linked patient to our clinic's [employer].  2. Briefly discussed ambulatory glucose profile report with patient. No changes in medications today per Dr. Behm given patient is fasting for Ramadan.    Jake Hammer, PharmD, [facility]  PGY1 Ambulatory Care Pharmacy Resident

## 2022-04-21 PROCEDURE — 87338 HPYLORI STOOL AG IA: CPT | Performed by: STUDENT IN AN ORGANIZED HEALTH CARE EDUCATION/TRAINING PROGRAM

## 2022-04-22 ENCOUNTER — TELEPHONE (OUTPATIENT)
Dept: FAMILY MEDICINE | Facility: CLINIC | Age: 47
End: 2022-04-22
Payer: COMMERCIAL

## 2022-04-22 LAB — H PYLORI AG STL QL IA: POSITIVE

## 2022-04-22 NOTE — TELEPHONE ENCOUNTER
[facility] phone call message- general phone call:    Reason for call: patient just saw his test results that he is positive for H pylori.  He is wondering if any medication is going to be prescribed.    Return call needed: Yes    OK to leave a message on voice mail? Yes    Primary language: English      needed? No    Call taken on April 22, 2022 at 5:01 PM by Tiffany Paris

## 2022-04-26 ENCOUNTER — VIRTUAL VISIT (OUTPATIENT)
Dept: FAMILY MEDICINE | Facility: CLINIC | Age: 47
End: 2022-04-26
Payer: COMMERCIAL

## 2022-04-26 DIAGNOSIS — A04.8 H. PYLORI INFECTION: Primary | ICD-10-CM

## 2022-04-26 PROCEDURE — 99442 PR PHYSICIAN TELEPHONE EVALUATION 11-20 MIN: CPT | Mod: 95 | Performed by: STUDENT IN AN ORGANIZED HEALTH CARE EDUCATION/TRAINING PROGRAM

## 2022-04-26 RX ORDER — TETRACYCLINE HYDROCHLORIDE 500 MG/1
500 CAPSULE ORAL 4 TIMES DAILY
Qty: 40 CAPSULE | Refills: 0 | Status: CANCELLED | OUTPATIENT
Start: 2022-04-26 | End: 2022-05-06

## 2022-04-26 RX ORDER — METRONIDAZOLE 250 MG/1
250 TABLET ORAL 4 TIMES DAILY
Qty: 40 TABLET | Refills: 0 | Status: CANCELLED | OUTPATIENT
Start: 2022-04-26 | End: 2022-05-06

## 2022-04-26 RX ORDER — AMOXICILLIN 500 MG/1
1000 CAPSULE ORAL 2 TIMES DAILY
Qty: 56 CAPSULE | Refills: 0 | Status: SHIPPED | OUTPATIENT
Start: 2022-04-26 | End: 2022-05-10

## 2022-04-26 RX ORDER — OMEPRAZOLE 40 MG/1
40 CAPSULE, DELAYED RELEASE ORAL DAILY
Qty: 14 CAPSULE | Refills: 0 | Status: SHIPPED | OUTPATIENT
Start: 2022-04-26 | End: 2022-05-10

## 2022-04-26 RX ORDER — BISMUTH SUBSALICYLATE 262 MG/1
1 TABLET, CHEWABLE ORAL
Qty: 40 TABLET | Refills: 0 | Status: CANCELLED | OUTPATIENT
Start: 2022-04-26 | End: 2022-05-06

## 2022-04-26 RX ORDER — CLARITHROMYCIN 500 MG
500 TABLET ORAL 2 TIMES DAILY
Qty: 28 TABLET | Refills: 0 | Status: SHIPPED | OUTPATIENT
Start: 2022-04-26 | End: 2022-05-10

## 2022-04-26 NOTE — PROGRESS NOTES
Koko is a 47 year old who is being evaluated via a billable telephone visit.      What phone number would you like to be contacted at? [phone]  How would you like to obtain your AVS? Not needed    Assessment & Plan     Koko was seen today for follow-up of H. pylori test ordered by another provider.    H. pylori infection  Patient has longstanding history of acid reflux and epigastric pain with bloating.  Today, we will start triple therapy for confirmed H. pylori infection.  He expresses understanding of regimen and will follow-up for repeat testing in a few months.  -     omeprazole (PRILOSEC) 40 MG DR capsule; Take 1 capsule (40 mg) by mouth daily for 14 days  -     amoxicillin (AMOXIL) 500 MG capsule; Take 2 capsules (1,000 mg) by mouth 2 times daily for 14 days  -     clarithromycin (BIAXIN) 500 MG tablet; Take 1 tablet (500 mg) by mouth 2 times daily for 14 days      Return in about 2 months (around 6/26/2022) for recheck H pylori.    Mary Wade MD  [facility]   Koko is a 47 year old who presents for the following health issues     Still having gas and bloating after eating.  Feeling full early.  Has acid reflux.  No pain now, but has some pain after eating a bit.  Testing for H. pylori came back positive the patient is willing to start antibiotic therapy today.  Reports that he is able to take multiple medications without in person visit to show him how.    Review of Systems   Constitutional, HEENT, cardiovascular, pulmonary, gi and gu systems are negative, except as otherwise noted.      Objective           Vitals: No vitals were obtained today due to virtual visit.    Physical Exam   healthy, alert and no distress  PSYCH: Alert and oriented times 3; coherent speech, normal   rate and volume, able to articulate logical thoughts, able   to abstract reason, no tangential thoughts, no hallucinations   or delusions  His affect is normal and pleasant  RESP: No  cough, no audible wheezing, able to talk in full sentences  Remainder of exam unable to be completed due to telephone visits    Office Visit on 04/20/2022   Component Date Value Ref Range Status     Helicobacter pylori Antigen Stool 04/21/2022 Positive (A) Negative Final    Positive for Helicobacter pylori antigen by enzyme immunoassay. A positive result indicates the presence of H. pylori antigen.             Phone call duration: 16 minutes

## 2022-04-26 NOTE — PROGRESS NOTES
Preceptor Attestation:   I talked to the patient on the phone. I have verified the content of the note, which accurately reflects my assessment of the patient and the plan of care.   Supervising Physician:  Conrado Stout MD.

## 2022-04-28 NOTE — PROGRESS NOTES
I have verified the content of the note, which accurately reflects my assessment of the patient and the plan of care.   Faith Cruz, ESPERANZA, PharmD

## 2022-05-13 DIAGNOSIS — A04.8 H. PYLORI INFECTION: ICD-10-CM

## 2022-05-13 RX ORDER — OMEPRAZOLE 40 MG/1
CAPSULE, DELAYED RELEASE ORAL
Qty: 14 CAPSULE | Refills: 0 | OUTPATIENT
Start: 2022-05-13

## 2022-05-13 NOTE — TELEPHONE ENCOUNTER
Refill request for PPI and patient recently treated for H. pylori infection.  I called patient to discuss.      Pt will call for appt on Monday morning    In the meantime, he will take famotidine 20 mg twice daily for his symptoms    We will retest for H. pylori if symptoms continue after 1 to 2 months of positive test result    ----  Mary Wade MD  PGY-3  Family Medicine Resident

## 2022-05-15 ENCOUNTER — HEALTH MAINTENANCE LETTER (OUTPATIENT)
Age: 47
End: 2022-05-15

## 2022-07-06 DIAGNOSIS — E11.69 TYPE 2 DIABETES MELLITUS WITH OTHER SPECIFIED COMPLICATION, WITHOUT LONG-TERM CURRENT USE OF INSULIN (H): Primary | ICD-10-CM

## 2022-07-07 DIAGNOSIS — E11.65 TYPE 2 DIABETES MELLITUS WITH HYPERGLYCEMIA, WITHOUT LONG-TERM CURRENT USE OF INSULIN (H): ICD-10-CM

## 2022-07-08 DIAGNOSIS — Z79.4 TYPE 2 DIABETES MELLITUS WITHOUT COMPLICATION, WITH LONG-TERM CURRENT USE OF INSULIN (H): ICD-10-CM

## 2022-07-08 DIAGNOSIS — E11.9 TYPE 2 DIABETES MELLITUS WITHOUT COMPLICATION, WITH LONG-TERM CURRENT USE OF INSULIN (H): ICD-10-CM

## 2022-07-08 RX ORDER — ALCOHOL ANTISEPTIC PADS
PADS, MEDICATED (EA) TOPICAL
Qty: 100 EACH | Refills: 3 | Status: SHIPPED | OUTPATIENT
Start: 2022-07-08 | End: 2024-03-18

## 2022-07-08 NOTE — TELEPHONE ENCOUNTER
Received request for metformin 1000mg tablet refill. Declined refill because per chart review, patient was switched to metformin XR 500mg tablets.     Pilar Stewart MD PGY2  [facility] Family Medicine Residency  07/08/22

## 2022-07-08 NOTE — TELEPHONE ENCOUNTER
I am sending you this refill because it came under your preceptor's name.  Please route it back to your primary PCS.  Nydia Boyd, CMA

## 2022-07-13 DIAGNOSIS — E11.9 TYPE 2 DIABETES MELLITUS WITHOUT COMPLICATION, WITH LONG-TERM CURRENT USE OF INSULIN (H): ICD-10-CM

## 2022-07-13 DIAGNOSIS — Z79.4 TYPE 2 DIABETES MELLITUS WITHOUT COMPLICATION, WITH LONG-TERM CURRENT USE OF INSULIN (H): ICD-10-CM

## 2022-07-13 RX ORDER — FLASH GLUCOSE SENSOR
KIT MISCELLANEOUS
Qty: 2 EACH | Refills: 11 | Status: SHIPPED | OUTPATIENT
Start: 2022-07-13

## 2022-07-13 NOTE — TELEPHONE ENCOUNTER
You are getting this encounter due to PCP or attending status. Please route any following actions required to your designated MA.    Lance VILLANUEVA, EMT

## 2022-09-11 ENCOUNTER — HEALTH MAINTENANCE LETTER (OUTPATIENT)
Age: 47
End: 2022-09-11

## 2023-01-22 ENCOUNTER — HEALTH MAINTENANCE LETTER (OUTPATIENT)
Age: 48
End: 2023-01-22

## 2023-02-28 DIAGNOSIS — E11.65 TYPE 2 DIABETES MELLITUS WITH HYPERGLYCEMIA, WITHOUT LONG-TERM CURRENT USE OF INSULIN (H): ICD-10-CM

## 2023-02-28 RX ORDER — METFORMIN HCL 500 MG
TABLET, EXTENDED RELEASE 24 HR ORAL
Qty: 360 TABLET | Refills: 3 | Status: SHIPPED | OUTPATIENT
Start: 2023-02-28

## 2023-04-19 DIAGNOSIS — Z79.4 TYPE 2 DIABETES MELLITUS WITHOUT COMPLICATION, WITH LONG-TERM CURRENT USE OF INSULIN (H): ICD-10-CM

## 2023-04-19 DIAGNOSIS — E11.9 TYPE 2 DIABETES MELLITUS WITHOUT COMPLICATION, WITH LONG-TERM CURRENT USE OF INSULIN (H): ICD-10-CM

## 2023-04-20 RX ORDER — LIRAGLUTIDE 6 MG/ML
INJECTION SUBCUTANEOUS
Qty: 9 ML | Refills: 11 | Status: SHIPPED | OUTPATIENT
Start: 2023-04-20

## 2023-06-03 ENCOUNTER — HEALTH MAINTENANCE LETTER (OUTPATIENT)
Age: 48
End: 2023-06-03

## 2023-07-23 ENCOUNTER — HEALTH MAINTENANCE LETTER (OUTPATIENT)
Age: 48
End: 2023-07-23

## 2024-02-18 ENCOUNTER — HEALTH MAINTENANCE LETTER (OUTPATIENT)
Age: 49
End: 2024-02-18

## 2024-03-02 NOTE — LETTER
January 21, 2019      Koko Milton  [address]  [place] [address]        Dear Koko,  Vitamin D is better    continue vitamin D 200 IU daily   Please see below for your test results.    Resulted Orders   Hemoglobin A1c ([facility])   Result Value Ref Range    Hemoglobin A1C 10.8 (H) 4.1 - 5.7 %   Lipid Panel ([place])   Result Value Ref Range    Cholesterol 179.6 0.0 - 200.0 mg/dL    Cholesterol/HDL Ratio 4.6 0.0 - 5.0    HDL Cholesterol 39.3 (L) >40.0 mg/dL    LDL Cholesterol Calculated 89 0 - 129 mg/dL    Triglycerides 258.3 (H) 0.0 - 150.0 mg/dL    VLDL Cholesterol 51.7 (H) 7.0 - 32.0 mg/dL   Vitamin D 25-Hydroxy ([facility])   Result Value Ref Range    Vitamin D,25-Hydroxy 28.5 (L) 30.0 - 80.0 ng/mL    Narrative    Test performed by:  [facility] [address] [place] [address]  Deficiency <10.0 ng/mL  Insufficiency 10.0-29.9 ng/mL  Sufficiency 30.0-80.0 ng/mL  Toxicity (possible) >100.0 ng/mL       If you have any questions, please call the clinic to make an appointment.    Sincerely,    Burton Mireles MD  
January 21, 2019      Koko Milton  [address]  [place] [address]        Dear Koko,  Your cholesterol is good continue healthy eating   Please see below for your test results.    Resulted Orders   Hemoglobin A1c ([facility])   Result Value Ref Range    Hemoglobin A1C 10.8 (H) 4.1 - 5.7 %   Lipid Panel ([place])   Result Value Ref Range    Cholesterol 179.6 0.0 - 200.0 mg/dL    Cholesterol/HDL Ratio 4.6 0.0 - 5.0    HDL Cholesterol 39.3 (L) >40.0 mg/dL    LDL Cholesterol Calculated 89 0 - 129 mg/dL    Triglycerides 258.3 (H) 0.0 - 150.0 mg/dL    VLDL Cholesterol 51.7 (H) 7.0 - 32.0 mg/dL   Vitamin D 25-Hydroxy ([facility])   Result Value Ref Range    Vitamin D,25-Hydroxy 28.5 (L) 30.0 - 80.0 ng/mL    Narrative    Test performed by:  [facility] [address] [place] [address]  Deficiency <10.0 ng/mL  Insufficiency 10.0-29.9 ng/mL  Sufficiency 30.0-80.0 ng/mL  Toxicity (possible) >100.0 ng/mL       If you have any questions, please call the clinic to make an appointment.    Sincerely,    Burton Mireles MD  
January 21, 2019      Koko Milton  [address]  [place] [address]        Dear Koko,  A1C discussed in clinic    Basically was same as 10 months ago   We started insuline Lantus 10 units per day    will see you back in clinic in 1 to 2 1weeks   Please see below for your test results.    Resulted Orders   Hemoglobin A1c ([facility])   Result Value Ref Range    Hemoglobin A1C 10.8 (H) 4.1 - 5.7 %   Lipid Panel ([place])   Result Value Ref Range    Cholesterol 179.6 0.0 - 200.0 mg/dL    Cholesterol/HDL Ratio 4.6 0.0 - 5.0    HDL Cholesterol 39.3 (L) >40.0 mg/dL    LDL Cholesterol Calculated 89 0 - 129 mg/dL    Triglycerides 258.3 (H) 0.0 - 150.0 mg/dL    VLDL Cholesterol 51.7 (H) 7.0 - 32.0 mg/dL   Vitamin D 25-Hydroxy ([facility])   Result Value Ref Range    Vitamin D,25-Hydroxy 28.5 (L) 30.0 - 80.0 ng/mL    Narrative    Test performed by:  [facility]  [address] [place] [address]  Deficiency <10.0 ng/mL  Insufficiency 10.0-29.9 ng/mL  Sufficiency 30.0-80.0 ng/mL  Toxicity (possible) >100.0 ng/mL       If you have any questions, please call the clinic to make an appointment.    Sincerely,    Burton Mireles MD  
Opt out

## 2024-03-18 DIAGNOSIS — Z79.4 TYPE 2 DIABETES MELLITUS WITHOUT COMPLICATION, WITH LONG-TERM CURRENT USE OF INSULIN (H): ICD-10-CM

## 2024-03-18 DIAGNOSIS — E11.9 TYPE 2 DIABETES MELLITUS WITHOUT COMPLICATION, WITH LONG-TERM CURRENT USE OF INSULIN (H): ICD-10-CM

## 2024-03-18 RX ORDER — ALCOHOL ANTISEPTIC PADS
1 PADS, MEDICATED (EA) TOPICAL 4 TIMES DAILY
Qty: 100 EACH | Refills: 11 | Status: SHIPPED | OUTPATIENT
Start: 2024-03-18

## 2024-07-07 ENCOUNTER — HEALTH MAINTENANCE LETTER (OUTPATIENT)
Age: 49
End: 2024-07-07

## 2024-09-15 ENCOUNTER — HEALTH MAINTENANCE LETTER (OUTPATIENT)
Age: 49
End: 2024-09-15

## 2025-04-03 ENCOUNTER — TELEPHONE (OUTPATIENT)
Dept: FAMILY MEDICINE | Facility: CLINIC | Age: 50
End: 2025-04-03
Payer: COMMERCIAL

## 2025-04-03 NOTE — TELEPHONE ENCOUNTER
Accesses patient chart in error whiling doing fax med refill.  Not [facility] patient.    Hector Nieves MA

## 2025-04-05 ENCOUNTER — HEALTH MAINTENANCE LETTER (OUTPATIENT)
Age: 50
End: 2025-04-05